# Patient Record
Sex: MALE | Race: WHITE | NOT HISPANIC OR LATINO | Employment: FULL TIME | URBAN - METROPOLITAN AREA
[De-identification: names, ages, dates, MRNs, and addresses within clinical notes are randomized per-mention and may not be internally consistent; named-entity substitution may affect disease eponyms.]

---

## 2017-12-30 ENCOUNTER — OFFICE VISIT (OUTPATIENT)
Dept: URGENT CARE | Facility: PHYSICIAN GROUP | Age: 27
End: 2017-12-30
Payer: COMMERCIAL

## 2017-12-30 VITALS
SYSTOLIC BLOOD PRESSURE: 112 MMHG | TEMPERATURE: 98.9 F | DIASTOLIC BLOOD PRESSURE: 78 MMHG | WEIGHT: 263 LBS | HEART RATE: 73 BPM | HEIGHT: 74 IN | BODY MASS INDEX: 33.75 KG/M2 | OXYGEN SATURATION: 95 % | RESPIRATION RATE: 16 BRPM

## 2017-12-30 DIAGNOSIS — H66.002 ACUTE SUPPURATIVE OTITIS MEDIA OF LEFT EAR WITHOUT SPONTANEOUS RUPTURE OF TYMPANIC MEMBRANE, RECURRENCE NOT SPECIFIED: Primary | ICD-10-CM

## 2017-12-30 DIAGNOSIS — H91.92 DECREASED HEARING OF LEFT EAR: ICD-10-CM

## 2017-12-30 PROCEDURE — 99204 OFFICE O/P NEW MOD 45 MIN: CPT | Performed by: PHYSICIAN ASSISTANT

## 2017-12-30 RX ORDER — LORATADINE 10 MG/1
10 TABLET ORAL DAILY
COMMUNITY
End: 2018-09-20

## 2017-12-30 RX ORDER — AMOXICILLIN AND CLAVULANATE POTASSIUM 875; 125 MG/1; MG/1
1 TABLET, FILM COATED ORAL 2 TIMES DAILY
Qty: 20 TAB | Refills: 0 | Status: SHIPPED | OUTPATIENT
Start: 2017-12-30 | End: 2018-09-20

## 2017-12-30 NOTE — PROGRESS NOTES
"Subjective:      Colin Hart is a 27 y.o. male who presents with Ear Fullness (C/o LT ear fullness, loss of hearing x2 wks off/on)    PMH: Reviewed with patient/family member/EPIC.   MEDS:   Current Outpatient Prescriptions:   •  Chlorpheniramine Maleate (ALLERGY PO), Take  by mouth., Disp: , Rfl:   •  loratadine (CLARITIN) 10 MG Tab, Take 10 mg by mouth every day., Disp: , Rfl:   •  oxycodone-acetaminophen (PERCOCET) 7.5-325 MG per tablet, Take 1 Tab by mouth every four hours as needed for Mild Pain., Disp: 30 Each, Rfl: 0  ALLERGIES: No Known Allergies  SURGHX: History reviewed. No pertinent surgical history.  SOCHX:  reports that he has never smoked. His smokeless tobacco use includes Chew.  FH:  Reviewed with patient/family. Not pertinent to this complaint.         Ear Fullness   This is a new problem. The current episode started 1 to 4 weeks ago. The problem occurs constantly. The problem has been gradually worsening. Associated symptoms include congestion and swollen glands. Pertinent negatives include no coughing, fever, headaches, sore throat or vertigo. Exacerbated by: lying down, talking. He has tried NSAIDs and position changes for the symptoms. The treatment provided no relief.       Review of Systems   Constitutional: Negative for fever.   HENT: Positive for congestion, ear pain and hearing loss. Negative for sore throat.    Respiratory: Negative for cough.    Neurological: Negative for vertigo and headaches.   All other systems reviewed and are negative.         Objective:     /78   Pulse 73   Temp 37.2 °C (98.9 °F)   Resp 16   Ht 1.88 m (6' 2\")   Wt 119.3 kg (263 lb)   SpO2 95%   BMI 33.77 kg/m²      Physical Exam   Constitutional: He is oriented to person, place, and time. He appears well-developed and well-nourished. No distress.   HENT:   Head: Normocephalic and atraumatic.   Right Ear: Tympanic membrane normal.   Left Ear: No drainage or tenderness. Tympanic membrane is erythematous " and bulging. A middle ear effusion is present. Decreased hearing is noted.   Nose: Nose normal.   Mouth/Throat: Uvula is midline, oropharynx is clear and moist and mucous membranes are normal.   Eyes: Conjunctivae and EOM are normal. Pupils are equal, round, and reactive to light.   Neck: Normal range of motion. Neck supple.   Cardiovascular: Normal rate, regular rhythm and normal heart sounds.    Pulmonary/Chest: Effort normal and breath sounds normal.   Musculoskeletal: Normal range of motion.   Neurological: He is alert and oriented to person, place, and time. Gait normal.   Skin: Skin is warm and dry. Capillary refill takes less than 2 seconds.   Psychiatric: He has a normal mood and affect.   Nursing note and vitals reviewed.              Assessment/Plan:     1. Acute suppurative otitis media of left ear without spontaneous rupture of tympanic membrane, recurrence not specified  amoxicillin-clavulanate (AUGMENTIN) 875-125 MG Tab   2. Decreased hearing of left ear  amoxicillin-clavulanate (AUGMENTIN) 875-125 MG Tab   PT can take otc decongestant like sudafed.    PT should follow up with PCP in 1-2 days for re-evaluation if symptoms have not improved.  Discussed red flags and reasons to return to UC or ED.  Pt and/or family verbalized understanding of diagnosis and follow up instructions and was offered informational handout on diagnosis.  PT discharged.

## 2018-01-02 ASSESSMENT — ENCOUNTER SYMPTOMS
VERTIGO: 0
FEVER: 0
SWOLLEN GLANDS: 1
COUGH: 0
HEADACHES: 0
SORE THROAT: 0

## 2018-04-17 ENCOUNTER — OFFICE VISIT (OUTPATIENT)
Dept: URGENT CARE | Facility: PHYSICIAN GROUP | Age: 28
End: 2018-04-17
Payer: COMMERCIAL

## 2018-04-17 VITALS
DIASTOLIC BLOOD PRESSURE: 74 MMHG | OXYGEN SATURATION: 97 % | BODY MASS INDEX: 34.27 KG/M2 | HEIGHT: 74 IN | TEMPERATURE: 98.1 F | HEART RATE: 88 BPM | WEIGHT: 267 LBS | SYSTOLIC BLOOD PRESSURE: 116 MMHG

## 2018-04-17 DIAGNOSIS — R11.0 NAUSEA: ICD-10-CM

## 2018-04-17 DIAGNOSIS — A09 INFECTIOUS ENTERITIS, UNSPECIFIED INFECTIOUS AGENT: ICD-10-CM

## 2018-04-17 PROCEDURE — 99203 OFFICE O/P NEW LOW 30 MIN: CPT | Performed by: EMERGENCY MEDICINE

## 2018-04-17 RX ORDER — ONDANSETRON 8 MG/1
8 TABLET, ORALLY DISINTEGRATING ORAL EVERY 8 HOURS PRN
Qty: 3 TAB | Refills: 0 | Status: SHIPPED | OUTPATIENT
Start: 2018-04-17 | End: 2018-09-20

## 2018-04-17 ASSESSMENT — ENCOUNTER SYMPTOMS
SHORTNESS OF BREATH: 0
COUGH: 0
SORE THROAT: 0
VOMITING: 0
ABDOMINAL PAIN: 1
NAUSEA: 1
CHILLS: 1
BLOOD IN STOOL: 0
FLANK PAIN: 0
DIARRHEA: 1
FEVER: 0

## 2018-04-17 NOTE — PATIENT INSTRUCTIONS
Use over-the-counter acetaminophen (Tylenol) as needed for pain relief; follow the package instructions for dosing.  Use over-the-counter famotidine (Pepcid AC), ranitidine (Zantac) or cimetidine (Tagamet) as needed for relief of symptoms; follow the package instructions for dosing.    Food Choices to Help Relieve Diarrhea, Adult  When you have diarrhea, the foods you eat and your eating habits are very important. Choosing the right foods and drinks can help relieve diarrhea. Also, because diarrhea can last up to 7 days, you need to replace lost fluids and electrolytes (such as sodium, potassium, and chloride) in order to help prevent dehydration.   WHAT GENERAL GUIDELINES DO I NEED TO FOLLOW?  · Slowly drink 1 cup (8 oz) of fluid for each episode of diarrhea. If you are getting enough fluid, your urine will be clear or pale yellow.  · Eat starchy foods. Some good choices include white rice, white toast, pasta, low-fiber cereal, baked potatoes (without the skin), saltine crackers, and bagels.  · Avoid large servings of any cooked vegetables.  · Limit fruit to two servings per day. A serving is ½ cup or 1 small piece.  · Choose foods with less than 2 g of fiber per serving.  · Limit fats to less than 8 tsp (38 g) per day.  · Avoid fried foods.  · Eat foods that have probiotics in them. Probiotics can be found in certain dairy products.  · Avoid foods and beverages that may increase the speed at which food moves through the stomach and intestines (gastrointestinal tract). Things to avoid include:  ¨ High-fiber foods, such as dried fruit, raw fruits and vegetables, nuts, seeds, and whole grain foods.  ¨ Spicy foods and high-fat foods.  ¨ Foods and beverages sweetened with high-fructose corn syrup, honey, or sugar alcohols such as xylitol, sorbitol, and mannitol.  WHAT FOODS ARE RECOMMENDED?  Grains  White rice. White, French, or priya breads (fresh or toasted), including plain rolls, buns, or bagels. White pasta.  Saltine, soda, or thao crackers. Pretzels. Low-fiber cereal. Cooked cereals made with water (such as cornmeal, farina, or cream cereals). Plain muffins. Matzo. Pam toast. Zwieback.   Vegetables  Potatoes (without the skin). Strained tomato and vegetable juices. Most well-cooked and canned vegetables without seeds. Tender lettuce.  Fruits  Cooked or canned applesauce, apricots, cherries, fruit cocktail, grapefruit, peaches, pears, or plums. Fresh bananas, apples without skin, cherries, grapes, cantaloupe, grapefruit, peaches, oranges, or plums.   Meat and Other Protein Products  Baked or boiled chicken. Eggs. Tofu. Fish. Seafood. Smooth peanut butter. Ground or well-cooked tender beef, ham, veal, lamb, pork, or poultry.   Dairy  Plain yogurt, kefir, and unsweetened liquid yogurt. Lactose-free milk, buttermilk, or soy milk. Plain hard cheese.  Beverages  Sport drinks. Clear broths. Diluted fruit juices (except prune). Regular, caffeine-free sodas such as ginger ale. Water. Decaffeinated teas. Oral rehydration solutions. Sugar-free beverages not sweetened with sugar alcohols.  Other  Bouillon, broth, or soups made from recommended foods.   The items listed above may not be a complete list of recommended foods or beverages. Contact your dietitian for more options.  WHAT FOODS ARE NOT RECOMMENDED?  Grains  Whole grain, whole wheat, bran, or rye breads, rolls, pastas, crackers, and cereals. Wild or brown rice. Cereals that contain more than 2 g of fiber per serving. Corn tortillas or taco shells. Cooked or dry oatmeal. Granola. Popcorn.  Vegetables  Raw vegetables. Cabbage, broccoli, Roland sprouts, artichokes, baked beans, beet greens, corn, kale, legumes, peas, sweet potatoes, and yams. Potato skins. Cooked spinach and cabbage.  Fruits  Dried fruit, including raisins and dates. Raw fruits. Stewed or dried prunes. Fresh apples with skin, apricots, mangoes, pears, raspberries, and strawberries.   Meat and Other  Protein Products  Surprise peanut butter. Nuts and seeds. Beans and lentils. Waterman.   Dairy  High-fat cheeses. Milk, chocolate milk, and beverages made with milk, such as milk shakes. Cream. Ice cream.  Sweets and Desserts  Sweet rolls, doughnuts, and sweet breads. Pancakes and waffles.  Fats and Oils  Butter. Cream sauces. Margarine. Salad oils. Plain salad dressings. Olives. Avocados.   Beverages  Caffeinated beverages (such as coffee, tea, soda, or energy drinks). Alcoholic beverages. Fruit juices with pulp. Prune juice. Soft drinks sweetened with high-fructose corn syrup or sugar alcohols.  Other  Coconut. Hot sauce. Chili powder. Mayonnaise. Gravy. Cream-based or milk-based soups.   The items listed above may not be a complete list of foods and beverages to avoid. Contact your dietitian for more information.  WHAT SHOULD I DO IF I BECOME DEHYDRATED?  Diarrhea can sometimes lead to dehydration. Signs of dehydration include dark urine and dry mouth and skin. If you think you are dehydrated, you should rehydrate with an oral rehydration solution. These solutions can be purchased at pharmacies, retail stores, or online.   Drink ½-1 cup (120-240 mL) of oral rehydration solution each time you have an episode of diarrhea. If drinking this amount makes your diarrhea worse, try drinking smaller amounts more often. For example, drink 1-3 tsp (5-15 mL) every 5-10 minutes.   A general rule for staying hydrated is to drink 1½-2 L of fluid per day. Talk to your health care provider about the specific amount you should be drinking each day. Drink enough fluids to keep your urine clear or pale yellow.     This information is not intended to replace advice given to you by your health care provider. Make sure you discuss any questions you have with your health care provider.     Document Released: 03/09/2005 Document Revised: 01/08/2016 Document Reviewed: 11/10/2014  Elsevier Interactive Patient Education ©2016 [employer]    Viral Gastroenteritis, Adult  Introduction  Viral gastroenteritis is also known as the stomach flu. This condition is caused by certain germs (viruses). These germs can be passed from person to person very easily (are very contagious). This condition can cause sudden watery poop (diarrhea), fever, and throwing up (vomiting).  Having watery poop and throwing up can make you feel weak and cause you to get dehydrated. Dehydration can make you tired and thirsty, make you have a dry mouth, and make it so you pee (urinate) less often. Older adults and people with other diseases or a weak defense system (immune system) are at higher risk for dehydration. It is important to replace the fluids that you lose from having watery poop and throwing up.  Follow these instructions at home:  Follow instructions from your doctor about how to care for yourself at home.  Eating and drinking  Follow these instructions as told by your doctor:  · Take an oral rehydration solution (ORS). This is a drink that is sold at pharmacies and stores.  · Drink clear fluids in small amounts as you are able, such as:  ¨ Water.  ¨ Ice chips.  ¨ Diluted fruit juice.  ¨ Low-calorie sports drinks.  · Eat bland, easy-to-digest foods in small amounts as you are able, such as:  ¨ Bananas.  ¨ Applesauce.  ¨ Rice.  ¨ Low-fat (lean) meats.  ¨ Toast.  ¨ Crackers.  · Avoid fluids that have a lot of sugar or caffeine in them.  · Avoid alcohol.  · Avoid spicy or fatty foods.  General instructions  · Drink enough fluid to keep your pee (urine) clear or pale yellow.  · Wash your hands often. If you cannot use soap and water, use hand .  · Make sure that all people in your home wash their hands well and often.  · Rest at home while you get better.  · Take over-the-counter and prescription medicines only as told by your doctor.  · Watch your condition for any changes.  · Take a warm bath to help with any burning or pain from having watery poop.  · Keep  all follow-up visits as told by your doctor. This is important.  Contact a doctor if:  · You cannot keep fluids down.  · Your symptoms get worse.  · You have new symptoms.  · You feel light-headed or dizzy.  · You have muscle cramps.  Get help right away if:  · You have chest pain.  · You feel very weak or you pass out (faint).  · You see blood in your throw-up.  · Your throw-up looks like coffee grounds.  · You have bloody or black poop (stools) or poop that look like tar.  · You have a very bad headache, a stiff neck, or both.  · You have a rash.  · You have very bad pain, cramping, or bloating in your belly (abdomen).  · You have trouble breathing.  · You are breathing very quickly.  · Your heart is beating very quickly.  · Your skin feels cold and clammy.  · You feel confused.  · You have pain when you pee.  · You have signs of dehydration, such as:  ¨ Dark pee, hardly any pee, or no pee.  ¨ Cracked lips.  ¨ Dry mouth.  ¨ Sunken eyes.  ¨ Sleepiness.  ¨ Weakness.  This information is not intended to replace advice given to you by your health care provider. Make sure you discuss any questions you have with your health care provider.  Document Released: 06/05/2009 Document Revised: 07/07/2017 Document Reviewed: 08/23/2016  © 2017 Elsevier

## 2018-04-17 NOTE — LETTER
April 17, 2018       Patient: Colin Hart   YOB: 1990   Date of Visit: 4/17/2018         To Whom It May Concern:    It is my medical opinion that Colin Hart should not attend work today or tomorrow.      Sincerely,          Brian Cardenas M.D.  Electronically Signed

## 2018-04-17 NOTE — PROGRESS NOTES
Subjective:      Colin Hart is a 27 y.o. male who presents with Abdominal Pain (Upper middle abdominal pain, diarrhea - onset last night)            Diarrhea    This is a new problem. The current episode started yesterday. The problem occurs more than 10 times per day. The problem has been gradually improving. The stool consistency is described as watery. Associated symptoms include abdominal pain and chills. Pertinent negatives include no coughing, fever or vomiting. Nothing aggravates the symptoms. Risk factors include travel to endemic area. He has tried increased fluids for the symptoms. The treatment provided no relief. There is no history of inflammatory bowel disease or irritable bowel syndrome.   Notes travel to [place] several months ago, no symptoms there. No recent antibiotic use, no recent high-risk exposure otherwise. Onset associated with crampy diffuse abdominal pain, nausea with water brash, then multiple episodes of diarrhea.    Review of Systems   Constitutional: Positive for chills and malaise/fatigue. Negative for fever.   HENT: Negative for sore throat.    Respiratory: Negative for cough and shortness of breath.    Cardiovascular: Negative for chest pain.   Gastrointestinal: Positive for abdominal pain, diarrhea and nausea. Negative for blood in stool and vomiting.   Genitourinary: Negative for flank pain and hematuria.   Skin: Negative for rash.     PMH:  has no past medical history on file.  MEDS:   Current Outpatient Prescriptions:   •  ondansetron (ZOFRAN ODT) 8 MG TABLET DISPERSIBLE, Take 1 Tab by mouth every 8 hours as needed for Nausea., Disp: 3 Tab, Rfl: 0  •  Chlorpheniramine Maleate (ALLERGY PO), Take  by mouth., Disp: , Rfl:   •  loratadine (CLARITIN) 10 MG Tab, Take 10 mg by mouth every day., Disp: , Rfl:   •  amoxicillin-clavulanate (AUGMENTIN) 875-125 MG Tab, Take 1 Tab by mouth 2 times a day., Disp: 20 Tab, Rfl: 0  •  oxycodone-acetaminophen (PERCOCET) 7.5-325 MG per tablet, Take  "1 Tab by mouth every four hours as needed for Mild Pain., Disp: 30 Each, Rfl: 0  ALLERGIES: No Known Allergies  SURGHX: History reviewed. No pertinent surgical history.  SOCHX:  reports that he has never smoked. His smokeless tobacco use includes Chew. He reports that he does not drink alcohol or use drugs.  FH: family history is not on file.       Objective:     /74   Pulse 88   Temp 36.7 °C (98.1 °F)   Ht 1.88 m (6' 2\")   Wt 121.1 kg (267 lb)   SpO2 97%   BMI 34.28 kg/m²      Physical Exam   Constitutional: He is oriented to person, place, and time. He appears well-developed and well-nourished. He is cooperative. He does not have a sickly appearance. He does not appear ill. No distress.   HENT:   Mouth/Throat: Oropharynx is clear and moist and mucous membranes are normal.   Eyes: Conjunctivae are normal. No scleral icterus.   Cardiovascular: Normal rate, regular rhythm and normal heart sounds.    Pulmonary/Chest: Effort normal and breath sounds normal.   Abdominal: Soft. Bowel sounds are normal. He exhibits no distension. There is no hepatosplenomegaly. There is tenderness in the epigastric area and periumbilical area. There is no rigidity, no rebound, no guarding, no CVA tenderness and negative Cornell's sign.   Neurological: He is alert and oriented to person, place, and time.   Skin: Skin is warm and dry.   Psychiatric: He has a normal mood and affect.               Assessment/Plan:     1. Infectious enteritis, unspecified infectious agent  Suspect viral  Recommended supportive care measures, including rest, increasing oral fluid intake and use of over-the-counter medications for relief of symptoms.    2. Nausea  - ondansetron (ZOFRAN ODT) 8 MG TABLET DISPERSIBLE; Take 1 Tab by mouth every 8 hours as needed for Nausea.  Dispense: 3 Tab; Refill: 0      "

## 2018-06-12 ENCOUNTER — OFFICE VISIT (OUTPATIENT)
Dept: URGENT CARE | Facility: PHYSICIAN GROUP | Age: 28
End: 2018-06-12
Payer: COMMERCIAL

## 2018-06-12 VITALS
BODY MASS INDEX: 33.6 KG/M2 | SYSTOLIC BLOOD PRESSURE: 128 MMHG | HEART RATE: 96 BPM | OXYGEN SATURATION: 96 % | WEIGHT: 261.8 LBS | DIASTOLIC BLOOD PRESSURE: 74 MMHG | TEMPERATURE: 98.9 F | HEIGHT: 74 IN

## 2018-06-12 DIAGNOSIS — R22.31 AXILLARY LUMP, RIGHT: ICD-10-CM

## 2018-06-12 PROCEDURE — 99213 OFFICE O/P EST LOW 20 MIN: CPT | Performed by: EMERGENCY MEDICINE

## 2018-06-12 RX ORDER — DOXYCYCLINE HYCLATE 100 MG
100 TABLET ORAL 2 TIMES DAILY
Qty: 14 TAB | Refills: 0 | Status: SHIPPED | OUTPATIENT
Start: 2018-06-12 | End: 2018-09-20

## 2018-06-12 ASSESSMENT — ENCOUNTER SYMPTOMS
FOCAL WEAKNESS: 0
SENSORY CHANGE: 0
FEVER: 0
CHILLS: 0

## 2018-06-13 NOTE — PROGRESS NOTES
"Subjective:      Colin Hart is a 27 y.o. male who presents with Lump (Lump located on R armpit, tender, constant pain, x3 days )            Rash   This is a new problem. Episode onset: 3 days. The problem has been gradually worsening since onset. The affected locations include the right axilla. The rash is characterized by swelling and pain. He was exposed to nothing. Pertinent negatives include no fever or joint pain. Treatments tried: warm compress. The treatment provided mild relief.   No known trauma; tetanus up-to-date. Notes onset of right axillary lump became more tender.    Review of Systems   Constitutional: Positive for malaise/fatigue. Negative for chills and fever.   Musculoskeletal: Negative for joint pain.   Skin: Positive for rash. Negative for itching.   Neurological: Negative for sensory change and focal weakness.       PMH:  has no past medical history on file.  MEDS:   Current Outpatient Prescriptions:   •  doxycycline (VIBRAMYCIN) 100 MG Tab, Take 1 Tab by mouth 2 times a day., Disp: 14 Tab, Rfl: 0  •  ondansetron (ZOFRAN ODT) 8 MG TABLET DISPERSIBLE, Take 1 Tab by mouth every 8 hours as needed for Nausea., Disp: 3 Tab, Rfl: 0  •  Chlorpheniramine Maleate (ALLERGY PO), Take  by mouth., Disp: , Rfl:   •  loratadine (CLARITIN) 10 MG Tab, Take 10 mg by mouth every day., Disp: , Rfl:   •  amoxicillin-clavulanate (AUGMENTIN) 875-125 MG Tab, Take 1 Tab by mouth 2 times a day., Disp: 20 Tab, Rfl: 0  •  oxycodone-acetaminophen (PERCOCET) 7.5-325 MG per tablet, Take 1 Tab by mouth every four hours as needed for Mild Pain., Disp: 30 Each, Rfl: 0  ALLERGIES: No Known Allergies  SURGHX: History reviewed. No pertinent surgical history.  SOCHX:  reports that he has never smoked. His smokeless tobacco use includes Chew. He reports that he does not drink alcohol or use drugs.  FH: family history is not on file.     Objective:     /74   Pulse 96   Temp 37.2 °C (98.9 °F)   Ht 1.88 m (6' 2\")   Wt 118.8 " kg (261 lb 12.8 oz)   SpO2 96%   BMI 33.61 kg/m²      Physical Exam   Constitutional: Vital signs are normal. He appears well-developed and well-nourished. He is cooperative. He does not have a sickly appearance. No distress.   Cardiovascular:   Distal capillary refill intact.   Musculoskeletal:        Right shoulder: He exhibits normal range of motion, no crepitus and no deformity.   Lymphadenopathy:        Right axillary: No pectoral and no lateral adenopathy present.   Neurological: He is alert.   Distal sensation to light touch and pressure intact.   Skin: Skin is warm and dry. Lesion noted.        Psychiatric: He has a normal mood and affect.          Suspect early superficial abscess.     Assessment/Plan:     1. Axillary lump, right  Warm compresses, OTC analgesia.  Advised recheck in 2-3 days unless resolving.  - doxycycline (VIBRAMYCIN) 100 MG Tab; Take 1 Tab by mouth 2 times a day.  Dispense: 14 Tab; Refill: 0

## 2018-06-13 NOTE — PATIENT INSTRUCTIONS
Apply warm compresses for 10-15 minutes 3-4 times daily.  Use over-the-counter pain reliever, such as acetaminophen (Tylenol), ibuprofen (Advil, Motrin) or naproxen (Aleve) as needed; follow package directions for dosing.  Use an oral probiotic daily, such as Culturelle, Align, or yogurt to reduce gastrointestinal symptoms.  Plan return for recheck in 2-3 days unless resolving.

## 2018-09-20 ENCOUNTER — OFFICE VISIT (OUTPATIENT)
Dept: URGENT CARE | Facility: PHYSICIAN GROUP | Age: 28
End: 2018-09-20
Payer: COMMERCIAL

## 2018-09-20 VITALS
DIASTOLIC BLOOD PRESSURE: 80 MMHG | RESPIRATION RATE: 15 BRPM | OXYGEN SATURATION: 97 % | HEIGHT: 75 IN | HEART RATE: 78 BPM | BODY MASS INDEX: 32.83 KG/M2 | TEMPERATURE: 99.2 F | WEIGHT: 264 LBS | SYSTOLIC BLOOD PRESSURE: 130 MMHG

## 2018-09-20 DIAGNOSIS — J30.1 SEASONAL ALLERGIC RHINITIS DUE TO POLLEN: ICD-10-CM

## 2018-09-20 DIAGNOSIS — R42 VERTIGO: ICD-10-CM

## 2018-09-20 DIAGNOSIS — H66.006 RECURRENT ACUTE SUPPURATIVE OTITIS MEDIA WITHOUT SPONTANEOUS RUPTURE OF TYMPANIC MEMBRANE OF BOTH SIDES: Primary | ICD-10-CM

## 2018-09-20 PROCEDURE — 99214 OFFICE O/P EST MOD 30 MIN: CPT | Performed by: PHYSICIAN ASSISTANT

## 2018-09-20 RX ORDER — AMOXICILLIN AND CLAVULANATE POTASSIUM 875; 125 MG/1; MG/1
1 TABLET, FILM COATED ORAL 2 TIMES DAILY
Qty: 20 TAB | Refills: 0 | Status: SHIPPED | OUTPATIENT
Start: 2018-09-20 | End: 2018-09-30

## 2018-09-20 RX ORDER — MECLIZINE HCL 12.5 MG/1
12.5 TABLET ORAL 3 TIMES DAILY PRN
Qty: 30 TAB | Refills: 0 | Status: SHIPPED | OUTPATIENT
Start: 2018-09-20 | End: 2018-09-25

## 2018-09-20 ASSESSMENT — ENCOUNTER SYMPTOMS
EYE WATERING: 0
SINUS PRESSURE: 0
NUMBNESS: 0
COUGH: 1
MIGRAINE HEADACHES: 0
VISUAL CHANGE: 1
ABDOMINAL PAIN: 0
CHILLS: 0
BLURRED VISION: 1
EYE PAIN: 0
FEVER: 0
CLUSTER HEADACHES: 0
WEAKNESS: 0
DIZZINESS: 1
HEADACHES: 1
FATIGUE: 1
SORE THROAT: 0
CHANGE IN BOWEL HABIT: 0
NAUSEA: 0
VERTIGO: 1
PHOTOPHOBIA: 1
SCALP TENDERNESS: 0
SWOLLEN GLANDS: 0
VOMITING: 0

## 2018-09-20 NOTE — PATIENT INSTRUCTIONS
1. Allegra-D every day for 7 days   2. Flonase       Modified Semont maneuver    The modified Semont maneuver is illustrated for left posterior canalithiasis. The seated patient begins by turning the head 45º to the right. The patient then quickly drops the trunk to the left side (1) with the head turned 45º to the right. This position is held for at least 30 seconds or until any provoked vertigo subsides. The patient then quickly sits up and lies down on the right side without stopping in the upright position (2), all the while keeping the head turned 45º to the right so that the head now faces partly down into the bed. This position is held for 30 seconds or until vertigo subsides. Then patient returns to the upright position (3). This maneuver is repeated three times a day until the patient is asymptomatic. For right posterior canalithiasis, the maneuver must be performed in the opposite direction, starting with the head turned toward the left.      Otitis Media, Adult  Otitis media is redness, soreness, and puffiness (swelling) in the space just behind your eardrum (middle ear). It may be caused by allergies or infection. It often happens along with a cold.  Follow these instructions at home:  · Take your medicine as told. Finish it even if you start to feel better.  · Only take over-the-counter or prescription medicines for pain, discomfort, or fever as told by your doctor.  · Follow up with your doctor as told.  Contact a doctor if:  · You have otitis media only in one ear, or bleeding from your nose, or both.  · You notice a lump on your neck.  · You are not getting better in 3-5 days.  · You feel worse instead of better.  Get help right away if:  · You have pain that is not helped with medicine.  · You have puffiness, redness, or pain around your ear.  · You get a stiff neck.  · You cannot move part of your face (paralysis).  · You notice that the bone behind your ear hurts when you touch it.  This  information is not intended to replace advice given to you by your health care provider. Make sure you discuss any questions you have with your health care provider.  Document Released: 06/05/2009 Document Revised: 05/25/2017 Document Reviewed: 07/15/2014  [employer] Interactive Patient Education © 2017 [employer]

## 2018-09-20 NOTE — PROGRESS NOTES
Subjective:   Colin Hart is a 27 y.o. male who presents for Dizziness (yesterday ) and Headache (x 1 week )    Dizziness   This is a new problem. The current episode started today. The problem occurs constantly. Associated symptoms include coughing, fatigue, headaches, vertigo and a visual change. Pertinent negatives include no abdominal pain, change in bowel habit, chills, congestion, fever, nausea, numbness, sore throat, swollen glands, urinary symptoms, vomiting or weakness. Exacerbated by: head movements, sitting up or rolling over in bed.' He has tried NSAIDs for the symptoms. The treatment provided no relief.   Headache    This is a new problem. Episode onset: 4 weeks. The problem occurs intermittently. The problem has been unchanged. The pain is located in the bilateral and temporal region. The pain quality is similar to prior headaches. The quality of the pain is described as band-like, throbbing and dull. Associated symptoms include blurred vision, coughing, dizziness, ear pain, phonophobia, photophobia and a visual change. Pertinent negatives include no abdominal pain, eye pain, eye watering, fever, nausea, numbness, scalp tenderness, sinus pressure, sore throat, swollen glands, vomiting or weakness. The symptoms are aggravated by bright light. He has tried Excedrin for the symptoms. The treatment provided no relief. His past medical history is significant for migraines in the family, obesity and sinus disease. There is no history of cancer, cluster headaches, hypertension, immunosuppression, migraine headaches, recent head traumas or TMJ.       Pt has hx of vertigo associated with ear infection. Pt notes sx are worsening. Pt has hx of headaches 2 x a week. Pt notes a cough x 1 month, non-productive. Pt has hx of seasonal allergies. Pt currently tx with OTC allergy meds without improvement. Denies CP, SOB, syncope or presyncope.     Review of Systems   Constitutional: Positive for fatigue. Negative for  "chills and fever.   HENT: Positive for ear pain. Negative for congestion, sinus pressure and sore throat.    Eyes: Positive for blurred vision and photophobia. Negative for pain.   Respiratory: Positive for cough.    Gastrointestinal: Negative for abdominal pain, change in bowel habit, nausea and vomiting.   Neurological: Positive for dizziness, vertigo and headaches. Negative for weakness and numbness.       PMH:  has no past medical history on file.  MEDS:   Current Outpatient Prescriptions:   •  amoxicillin-clavulanate (AUGMENTIN) 875-125 MG Tab, Take 1 Tab by mouth 2 times a day for 10 days., Disp: 20 Tab, Rfl: 0  •  meclizine (ANTIVERT) 12.5 MG Tab, Take 1 Tab by mouth 3 times a day as needed for up to 5 days., Disp: 30 Tab, Rfl: 0  ALLERGIES: No Known Allergies  SURGHX: History reviewed. No pertinent surgical history.  SOCHX:  reports that he has never smoked. His smokeless tobacco use includes Chew. He reports that he does not drink alcohol or use drugs.  History reviewed. No pertinent family history.     Objective:   /80 (BP Location: Right arm, Patient Position: Sitting) Comment (BP Cuff Size): large  Pulse 78   Temp 37.3 °C (99.2 °F)   Resp 15   Ht 1.905 m (6' 3\")   Wt 119.7 kg (264 lb)   SpO2 97%   BMI 33.00 kg/m²     Physical Exam   Constitutional: He is oriented to person, place, and time. He appears well-developed and well-nourished. No distress.   HENT:   Head: Normocephalic and atraumatic.   Right Ear: Tympanic membrane is erythematous and retracted. Tympanic membrane is not perforated. A middle ear effusion is present.   Left Ear: Tympanic membrane is erythematous and bulging. Tympanic membrane is not perforated. A middle ear effusion is present.   Nose: Mucosal edema and rhinorrhea present. No sinus tenderness.   Mouth/Throat: No oropharyngeal exudate, posterior oropharyngeal edema or posterior oropharyngeal erythema.   Eyes: Pupils are equal, round, and reactive to light. Conjunctivae " and EOM are normal.   Neck: Normal range of motion. Neck supple.   Cardiovascular: Normal rate and regular rhythm.    Pulmonary/Chest: Effort normal and breath sounds normal. No respiratory distress. He has no wheezes. He has no rales.   Lymphadenopathy:     He has cervical adenopathy.   Neurological: He is alert and oriented to person, place, and time. He displays normal reflexes. No cranial nerve deficit or sensory deficit. He exhibits normal muscle tone. Coordination normal.   Skin: Skin is warm and dry.   Psychiatric: He has a normal mood and affect. His behavior is normal.   Vitals reviewed.        Assessment/Plan:     1. Recurrent acute suppurative otitis media without spontaneous rupture of tympanic membrane of both sides  amoxicillin-clavulanate (AUGMENTIN) 875-125 MG Tab    REFERRAL TO FOLLOW-UP WITH PRIMARY CARE   2. Vertigo  meclizine (ANTIVERT) 12.5 MG Tab   3. Seasonal allergic rhinitis due to pollen  REFERRAL TO FOLLOW-UP WITH PRIMARY CARE     Patient directed to take full course of abx regardless of sx resolution. If sx worsen or persist patient directed to return to clinic for reevaluation. Supportive care reviewed including: otc decongestant, Flonase and pushing fluids. Otitis media and Modified Semont maneuver educational handout given to patient.    Follow-up with primary care provider within 7-10 days. Red flags and emergency room precautions discussed.  Patient appears understanding of information.

## 2018-09-20 NOTE — LETTER
September 20, 2018         Patient: Colin Hart   YOB: 1990   Date of Visit: 9/20/2018           To Whom it May Concern:    Colin Hart was seen in my clinic on 9/20/2018. He may return to work on 9/22/18.    If you have any questions or concerns, please don't hesitate to call.        Sincerely,           Deb Gallardo P.A.-C.  Electronically Signed

## 2019-12-29 ENCOUNTER — OFFICE VISIT (OUTPATIENT)
Dept: URGENT CARE | Facility: PHYSICIAN GROUP | Age: 29
End: 2019-12-29
Payer: COMMERCIAL

## 2019-12-29 VITALS
BODY MASS INDEX: 32.87 KG/M2 | DIASTOLIC BLOOD PRESSURE: 82 MMHG | HEART RATE: 86 BPM | TEMPERATURE: 98.2 F | WEIGHT: 263 LBS | OXYGEN SATURATION: 97 % | RESPIRATION RATE: 14 BRPM | SYSTOLIC BLOOD PRESSURE: 110 MMHG

## 2019-12-29 DIAGNOSIS — H69.93 DYSFUNCTION OF BOTH EUSTACHIAN TUBES: ICD-10-CM

## 2019-12-29 DIAGNOSIS — R51.9 NONINTRACTABLE HEADACHE, UNSPECIFIED CHRONICITY PATTERN, UNSPECIFIED HEADACHE TYPE: ICD-10-CM

## 2019-12-29 PROCEDURE — 99214 OFFICE O/P EST MOD 30 MIN: CPT | Performed by: PHYSICIAN ASSISTANT

## 2019-12-29 RX ORDER — METHYLPREDNISOLONE 4 MG/1
TABLET ORAL
Qty: 21 TAB | Refills: 0 | Status: SHIPPED | OUTPATIENT
Start: 2019-12-29 | End: 2021-03-18

## 2019-12-29 RX ORDER — AMOXICILLIN AND CLAVULANATE POTASSIUM 875; 125 MG/1; MG/1
1 TABLET, FILM COATED ORAL 2 TIMES DAILY
Qty: 20 TAB | Refills: 0 | Status: SHIPPED | OUTPATIENT
Start: 2019-12-29 | End: 2021-03-18

## 2019-12-29 NOTE — PROGRESS NOTES
Chief Complaint   Patient presents with   • Otalgia     R ear    • Head Pain       HISTORY OF PRESENT ILLNESS: Patient is a 29 y.o. male who presents today for the following:    HA x 5 days, across the forehead, into nose/behind eyes  Right ear pain; denies drainage; muffled hearing  Mild fever a few days ago  H/o OM right ear  OTC meds today: none    There are no active problems to display for this patient.      Allergies:Patient has no known allergies.    Current Outpatient Medications Ordered in Epic   Medication Sig Dispense Refill   • methylPREDNISolone (MEDROL DOSEPAK) 4 MG Tablet Therapy Pack Use as package directs 21 Tab 0   • amoxicillin-clavulanate (AUGMENTIN) 875-125 MG Tab Take 1 Tab by mouth 2 times a day. 20 Tab 0     No current Epic-ordered facility-administered medications on file.        History reviewed. No pertinent past medical history.    Social History     Tobacco Use   • Smoking status: Never Smoker   • Smokeless tobacco: Current User     Types: Chew   Substance Use Topics   • Alcohol use: No   • Drug use: No       No family status information on file.   History reviewed. No pertinent family history.    Review of Systems:   Constitutional ROS: No unexpected change in weight, No weakness, No fatigue  Eye ROS: No recent significant change in vision, No eye pain, redness, discharge  Ear ROS: No drainage, No tinnitus or vertigo, No recent change in hearing  Mouth/Throat ROS: No teeth or gum problems, No bleeding gums, No tongue complaints  Neck ROS: No swollen glands, No significant pain in neck  Pulmonary ROS: No chronic cough, sputum, or hemoptysis, No dyspnea on exertion, No wheezing  Cardiovascular ROS: No diaphoresis, No edema, No palpitations  Musculoskeletal/Extremities ROS: No peripheral edema, No pain, redness or swelling on the joints  Hematologic/Lymphatic ROS: No chills, No night sweats, No weight loss  Skin/Integumentary ROS: No edema, No evidence of rash, No itching      Exam:  BP  110/82   Pulse 86   Temp 36.8 °C (98.2 °F) (Temporal)   Resp 14   Wt 119.3 kg (263 lb)   SpO2 97%   General: Well developed, well nourished. No distress.    Eye: PERRL/EOMI; conjunctivae clear, lids normal.  ENMT: Lips without lesions, MMM. Oropharynx is clear. Bilateral TMs are within normal limits but with serous effusions bilaterally and bulging, right worse than left.  The right TM is concerning for rupture..  Pulmonary: Unlabored respiratory effort. Lungs clear to auscultation, no wheezes, no rhonchi.    Cardiovascular: Regular rate and rhythm without murmur.   Neurologic: Grossly nonfocal. No facial asymmetry noted.  Lymph: No cervical lymphadenopathy noted.  Skin: Warm, dry, good turgor. No rashes in visible areas.   Psych: Normal mood. Alert and oriented to person, place and time.    Assessment/Plan:  Discussed likely viral etiology versus seasonal allergies.  Question if the headache is due to sinusitis.  We will have patient start with steroids and fill antibiotics if symptoms worsen at any point.  Discussed appropriate over-the-counter symptomatic medication, and when to return to clinic. Follow up for worsening or persistent symptoms.  1. Dysfunction of both eustachian tubes  methylPREDNISolone (MEDROL DOSEPAK) 4 MG Tablet Therapy Pack    amoxicillin-clavulanate (AUGMENTIN) 875-125 MG Tab   2. Nonintractable headache, unspecified chronicity pattern, unspecified headache type  amoxicillin-clavulanate (AUGMENTIN) 875-125 MG Tab

## 2020-02-06 ENCOUNTER — NON-PROVIDER VISIT (OUTPATIENT)
Dept: URGENT CARE | Facility: PHYSICIAN GROUP | Age: 30
End: 2020-02-06

## 2020-02-06 DIAGNOSIS — Z02.1 PRE-EMPLOYMENT DRUG SCREENING: ICD-10-CM

## 2020-02-06 PROCEDURE — 80305 DRUG TEST PRSMV DIR OPT OBS: CPT | Performed by: NURSE PRACTITIONER

## 2020-12-30 ENCOUNTER — OFFICE VISIT (OUTPATIENT)
Dept: URGENT CARE | Facility: PHYSICIAN GROUP | Age: 30
End: 2020-12-30
Payer: COMMERCIAL

## 2020-12-30 VITALS
HEIGHT: 75 IN | SYSTOLIC BLOOD PRESSURE: 118 MMHG | BODY MASS INDEX: 32.33 KG/M2 | OXYGEN SATURATION: 95 % | DIASTOLIC BLOOD PRESSURE: 74 MMHG | WEIGHT: 260 LBS | TEMPERATURE: 98.6 F | HEART RATE: 86 BPM | RESPIRATION RATE: 16 BRPM

## 2020-12-30 DIAGNOSIS — H65.04 RECURRENT ACUTE SEROUS OTITIS MEDIA OF RIGHT EAR: ICD-10-CM

## 2020-12-30 DIAGNOSIS — H69.90 DYSFUNCTION OF EUSTACHIAN TUBE, UNSPECIFIED LATERALITY: ICD-10-CM

## 2020-12-30 PROCEDURE — 99214 OFFICE O/P EST MOD 30 MIN: CPT | Performed by: PHYSICIAN ASSISTANT

## 2020-12-30 RX ORDER — AMOXICILLIN AND CLAVULANATE POTASSIUM 875; 125 MG/1; MG/1
1 TABLET, FILM COATED ORAL 2 TIMES DAILY
Qty: 20 TAB | Refills: 0 | Status: SHIPPED | OUTPATIENT
Start: 2020-12-30 | End: 2021-01-09

## 2020-12-30 RX ORDER — METHYLPREDNISOLONE 4 MG/1
TABLET ORAL
Qty: 21 TAB | Refills: 0 | Status: SHIPPED | OUTPATIENT
Start: 2020-12-30 | End: 2021-03-18

## 2020-12-30 ASSESSMENT — ENCOUNTER SYMPTOMS
SENSORY CHANGE: 0
DIARRHEA: 0
RHINORRHEA: 0
CHILLS: 0
SORE THROAT: 0
PALPITATIONS: 0
VOMITING: 0
FOCAL WEAKNESS: 0
SHORTNESS OF BREATH: 0
FEVER: 0
SINUS PAIN: 0
COUGH: 0
TINGLING: 0
HEADACHES: 0
WHEEZING: 0
NECK PAIN: 0
DIZZINESS: 1

## 2020-12-30 NOTE — PROGRESS NOTES
"Subjective:      Colin Hart is a 30 y.o. male who presents with Otalgia ((R) ear pain-states this is a yearly occurance )            Otalgia   There is pain in the right ear. This is a new problem. The current episode started in the past 7 days. The problem occurs constantly. The problem has been gradually worsening. There has been no fever. The pain is moderate. Pertinent negatives include no coughing, diarrhea, ear discharge, headaches, hearing loss, neck pain, rash, rhinorrhea, sore throat or vomiting. Associated symptoms comments: Mild vertigo (happens ever year). He has tried nothing for the symptoms. recurrent ear infections       No past medical history on file.    No past surgical history on file.    No family history on file.    Patient has no known allergies.    Medications, Allergies, and current problem list reviewed today in Epic      Review of Systems   Constitutional: Negative for chills, fever and malaise/fatigue.   HENT: Positive for ear pain. Negative for congestion, ear discharge, hearing loss, rhinorrhea, sinus pain and sore throat.    Respiratory: Negative for cough, shortness of breath and wheezing.    Cardiovascular: Negative for chest pain, palpitations and leg swelling.   Gastrointestinal: Negative for diarrhea and vomiting.   Musculoskeletal: Negative for neck pain.   Skin: Negative for rash.   Neurological: Positive for dizziness (vertigo). Negative for tingling, sensory change, focal weakness and headaches.     All other systems reviewed and are negative.        Objective:     /74   Pulse 86   Temp 37 °C (98.6 °F)   Resp 16   Ht 1.905 m (6' 3\")   Wt 117.9 kg (260 lb)   SpO2 95%   BMI 32.50 kg/m²      Physical Exam  Constitutional:       General: He is not in acute distress.     Appearance: He is not ill-appearing.   HENT:      Head: Normocephalic and atraumatic.      Right Ear: Ear canal and external ear normal. A middle ear effusion is present. Tympanic membrane is " injected.      Left Ear: Tympanic membrane, ear canal and external ear normal.   Eyes:      Extraocular Movements: Extraocular movements intact.      Conjunctiva/sclera: Conjunctivae normal.      Pupils: Pupils are equal, round, and reactive to light.   Cardiovascular:      Rate and Rhythm: Normal rate.   Pulmonary:      Effort: Pulmonary effort is normal. No respiratory distress.      Breath sounds: No stridor. No wheezing.   Musculoskeletal: Normal range of motion.   Skin:     General: Skin is warm and dry.   Neurological:      General: No focal deficit present.      Mental Status: He is alert and oriented to person, place, and time.   Psychiatric:         Mood and Affect: Mood normal.         Behavior: Behavior normal.         Thought Content: Thought content normal.         Judgment: Judgment normal.                 Assessment/Plan:        1. Recurrent acute serous otitis media of right ear    2. Dysfunction of Eustachian tube, unspecified laterality      - amoxicillin-clavulanate (AUGMENTIN) 875-125 MG Tab; Take 1 Tab by mouth 2 times a day for 10 days.  Dispense: 20 Tab; Refill: 0    - methylPREDNISolone (MEDROL DOSEPAK) 4 MG Tablet Therapy Pack; Follow schedule on package instructions.  Dispense: 21 Tab; Refill: 0    Differential diagnoses, Supportive care, and indications for immediate follow-up discussed with patient.   Pathogenesis of diagnosis discussed including typical length and natural progression.   Instructed to return to clinic or nearest emergency department for any change in condition, further concerns, or worsening of symptoms.    The patient demonstrated a good understanding and agreed with the treatment plan.    Yudi Mejia P.A.-C.

## 2021-01-06 ENCOUNTER — OFFICE VISIT (OUTPATIENT)
Dept: URGENT CARE | Facility: PHYSICIAN GROUP | Age: 31
End: 2021-01-06
Payer: COMMERCIAL

## 2021-01-06 ENCOUNTER — HOSPITAL ENCOUNTER (OUTPATIENT)
Facility: MEDICAL CENTER | Age: 31
End: 2021-01-06
Attending: PHYSICIAN ASSISTANT
Payer: COMMERCIAL

## 2021-01-06 VITALS
SYSTOLIC BLOOD PRESSURE: 110 MMHG | HEART RATE: 96 BPM | WEIGHT: 259 LBS | HEIGHT: 75 IN | DIASTOLIC BLOOD PRESSURE: 78 MMHG | BODY MASS INDEX: 32.2 KG/M2 | TEMPERATURE: 97.6 F | OXYGEN SATURATION: 98 % | RESPIRATION RATE: 16 BRPM

## 2021-01-06 DIAGNOSIS — R68.89 FLU-LIKE SYMPTOMS: ICD-10-CM

## 2021-01-06 LAB — COVID ORDER STATUS COVID19: NORMAL

## 2021-01-06 PROCEDURE — U0005 INFEC AGEN DETEC AMPLI PROBE: HCPCS

## 2021-01-06 PROCEDURE — U0003 INFECTIOUS AGENT DETECTION BY NUCLEIC ACID (DNA OR RNA); SEVERE ACUTE RESPIRATORY SYNDROME CORONAVIRUS 2 (SARS-COV-2) (CORONAVIRUS DISEASE [COVID-19]), AMPLIFIED PROBE TECHNIQUE, MAKING USE OF HIGH THROUGHPUT TECHNOLOGIES AS DESCRIBED BY CMS-2020-01-R: HCPCS

## 2021-01-06 PROCEDURE — 99213 OFFICE O/P EST LOW 20 MIN: CPT | Performed by: PHYSICIAN ASSISTANT

## 2021-01-06 NOTE — PROGRESS NOTES
Chief Complaint   Patient presents with   • Coronavirus Screening     loss of taste, fatigue        HISTORY OF PRESENT ILLNESS: Patient is a 30 y.o. male who presents today for the following:    Patient comes in for evaluation of body aches that started 2 days ago.  He complains of loss of taste and smell and very fatigued.  He reports associated chills but denies fever.  He denies shortness of breath and cough.  He denies any known Covid exposures and is not currently working.    There are no active problems to display for this patient.      Allergies:Patient has no known allergies.    Current Outpatient Medications Ordered in Epic   Medication Sig Dispense Refill   • amoxicillin-clavulanate (AUGMENTIN) 875-125 MG Tab Take 1 Tab by mouth 2 times a day for 10 days. 20 Tab 0   • methylPREDNISolone (MEDROL DOSEPAK) 4 MG Tablet Therapy Pack Follow schedule on package instructions. 21 Tab 0   • methylPREDNISolone (MEDROL DOSEPAK) 4 MG Tablet Therapy Pack Use as package directs (Patient not taking: Reported on 12/30/2020) 21 Tab 0   • amoxicillin-clavulanate (AUGMENTIN) 875-125 MG Tab Take 1 Tab by mouth 2 times a day. (Patient not taking: Reported on 12/30/2020) 20 Tab 0     No current Epic-ordered facility-administered medications on file.        No past medical history on file.    Social History     Tobacco Use   • Smoking status: Never Smoker   • Smokeless tobacco: Current User     Types: Chew   Substance Use Topics   • Alcohol use: No   • Drug use: No       No family status information on file.   No family history on file.    Review of Systems:   Constitutional ROS: No unexpected change in weight, No weakness, No fatigue  Eye ROS: No recent significant change in vision, No eye pain, redness, discharge  Ear ROS: No drainage, No tinnitus or vertigo, No recent change in hearing  Mouth/Throat ROS: No teeth or gum problems, No bleeding gums, No tongue complaints  Neck ROS: No swollen glands, No significant pain in  "neck  Pulmonary ROS: No chronic cough, sputum, or hemoptysis, No dyspnea on exertion, No wheezing  Cardiovascular ROS: No diaphoresis, No edema, No palpitations  Musculoskeletal/Extremities ROS: No peripheral edema, No pain, redness or swelling on the joints  Hematologic/Lymphatic ROS: Positive for body aches and chills  Skin/Integumentary ROS: No edema, No evidence of rash, No itching    Exam:  /78   Pulse 96   Temp 36.4 °C (97.6 °F)   Resp 16   Ht 1.905 m (6' 3\")   Wt 117.5 kg (259 lb)   SpO2 98%   General: Well developed, well nourished. No distress.    Eye: PERRL/EOMI; conjunctivae clear, lids normal.  ENMT: Lips without lesions, MMM. Oropharynx is clear. Bilateral TMs are within normal limits.  Pulmonary: Unlabored respiratory effort. Lungs clear to auscultation, no wheezes, no rhonchi.    Cardiovascular: Regular rate and rhythm without murmur.   Neurologic: Grossly nonfocal. No facial asymmetry noted.  Lymph: No cervical lymphadenopathy noted.  Skin: Warm, dry, good turgor. No rashes in visible areas.   Psych: Normal mood. Alert and oriented to person, place and time.    Assessment/Plan:  Discussed likely viral etiology.  Vitals and exam are unremarkable.  Low suspicion for pneumonia. Patient will be tested for COVID and has been advised to quarantine until results are available. Discussed appropriate over-the-counter symptomatic medication, and when to return to clinic. Follow up for worsening or persistent symptoms.  1. Flu-like symptoms  COVID/SARS CoV-2 PCR       "

## 2021-01-07 LAB
SARS-COV-2 RNA RESP QL NAA+PROBE: NOTDETECTED
SPECIMEN SOURCE: NORMAL

## 2021-03-18 ENCOUNTER — OFFICE VISIT (OUTPATIENT)
Dept: URGENT CARE | Facility: PHYSICIAN GROUP | Age: 31
End: 2021-03-18
Payer: COMMERCIAL

## 2021-03-18 VITALS
HEIGHT: 75 IN | SYSTOLIC BLOOD PRESSURE: 118 MMHG | WEIGHT: 263 LBS | TEMPERATURE: 98.6 F | DIASTOLIC BLOOD PRESSURE: 72 MMHG | HEART RATE: 94 BPM | OXYGEN SATURATION: 95 % | BODY MASS INDEX: 32.7 KG/M2 | RESPIRATION RATE: 16 BRPM

## 2021-03-18 DIAGNOSIS — H69.93 DYSFUNCTION OF BOTH EUSTACHIAN TUBES: ICD-10-CM

## 2021-03-18 PROCEDURE — 99213 OFFICE O/P EST LOW 20 MIN: CPT | Performed by: FAMILY MEDICINE

## 2021-03-18 RX ORDER — METHYLPREDNISOLONE 4 MG/1
TABLET ORAL
Qty: 21 TABLET | Refills: 0 | Status: SHIPPED | OUTPATIENT
Start: 2021-03-18 | End: 2021-03-24

## 2021-03-18 RX ORDER — AMOXICILLIN AND CLAVULANATE POTASSIUM 875; 125 MG/1; MG/1
TABLET, FILM COATED ORAL
Qty: 20 TABLET | Refills: 0 | Status: SHIPPED | OUTPATIENT
Start: 2021-03-18 | End: 2021-11-16

## 2021-03-19 NOTE — PROGRESS NOTES
Chief Complaint:    Chief Complaint   Patient presents with   • Otalgia     both ears. pt states happens 2 times a year. began yesterday morning.        History of Present Illness:    Both ears have been stuffy and popping for few days. He then started having some dizziness. That is when he knows to come in to get medication. He gets similar problem on regular basis, usually Medrol Dose Tyler alone will resolve problem. If problem goes on too long, he will need antibiotic (usually Augmentin). He had PE tubes as child.      Past Medical History:    History reviewed. No pertinent past medical history.    Past Surgical History:    History reviewed. No pertinent surgical history.    Social History:    Social History     Socioeconomic History   • Marital status: Single     Spouse name: Not on file   • Number of children: Not on file   • Years of education: Not on file   • Highest education level: Not on file   Occupational History   • Not on file   Tobacco Use   • Smoking status: Never Smoker   • Smokeless tobacco: Current User     Types: Chew   Substance and Sexual Activity   • Alcohol use: No   • Drug use: No   • Sexual activity: Yes     Partners: Female   Other Topics Concern   • Not on file   Social History Narrative   • Not on file     Social Determinants of Health     Financial Resource Strain:    • Difficulty of Paying Living Expenses:    Food Insecurity:    • Worried About Running Out of Food in the Last Year:    • Ran Out of Food in the Last Year:    Transportation Needs:    • Lack of Transportation (Medical):    • Lack of Transportation (Non-Medical):    Physical Activity:    • Days of Exercise per Week:    • Minutes of Exercise per Session:    Stress:    • Feeling of Stress :    Social Connections:    • Frequency of Communication with Friends and Family:    • Frequency of Social Gatherings with Friends and Family:    • Attends [employer] Services:    • Active Member of Clubs or Organizations:    • Attends Club or  "Organization Meetings:    • Marital Status:    Intimate Partner Violence:    • Fear of Current or Ex-Partner:    • Emotionally Abused:    • Physically Abused:    • Sexually Abused:      Family History:    History reviewed. No pertinent family history.    Medications:    No current outpatient medications on file prior to visit.     No current facility-administered medications on file prior to visit.     Allergies:    No Known Allergies      Vitals:    Vitals:    03/18/21 1657   BP: 118/72   Pulse: 94   Resp: 16   Temp: 37 °C (98.6 °F)   TempSrc: Temporal   SpO2: 95%   Weight: 119 kg (263 lb)   Height: 1.905 m (6' 3\")       Physical Exam:    Constitutional: Vital signs reviewed. Appears well-developed and well-nourished. No acute distress.   Eyes: Sclera white, conjunctivae clear. PERRLA.  ENT: External ears normal. External auditory canals normal without discharge. TMs translucent and non-bulging. Hearing normal.   Pulmonary/Chest: Respirations non-labored.   Musculoskeletal: Normal gait.   Neurological: Alert and oriented to person, place, and time. CN 2-12 intact. Coordination normal.   Skin: No rashes or lesions. Warm, dry, normal turgor.  Psychiatric: Normal mood and affect. Behavior is normal. Judgment and thought content normal.       Assessment / Plan:    1. Dysfunction of both eustachian tubes  - methylPREDNISolone (MEDROL DOSEPAK) 4 MG Tablet Therapy Pack; TAKE AS DIRECTED ON PACKAGE.  Dispense: 21 tablet; Refill: 0  - amoxicillin-clavulanate (AUGMENTIN) 875-125 MG Tab; 1 TAB BY MOUTH TWICE A DAY X 10 DAYS. TAKE WITH FOOD.  Dispense: 20 tablet; Refill: 0      Discussed with him DDX, management options, and risks, benefits, and alternatives to treatment plan agreed upon.    Will start with Medrol Dose Tyler.    Back-up Rx for Augmentin he will fill and take if getting worse or not better with steroid alone.    Discussed expected course of duration, time for improvement, and to seek follow-up in Emergency Room, " urgent care, or with PCP if getting worse at any time or not improving within expected time frame.

## 2021-10-28 ENCOUNTER — OFFICE VISIT (OUTPATIENT)
Dept: URGENT CARE | Facility: PHYSICIAN GROUP | Age: 31
End: 2021-10-28
Payer: COMMERCIAL

## 2021-10-28 VITALS
SYSTOLIC BLOOD PRESSURE: 104 MMHG | DIASTOLIC BLOOD PRESSURE: 80 MMHG | TEMPERATURE: 98.2 F | HEIGHT: 75 IN | HEART RATE: 84 BPM | WEIGHT: 265 LBS | BODY MASS INDEX: 32.95 KG/M2 | RESPIRATION RATE: 12 BRPM | OXYGEN SATURATION: 98 %

## 2021-10-28 DIAGNOSIS — H65.192 OTHER ACUTE NONSUPPURATIVE OTITIS MEDIA OF LEFT EAR, RECURRENCE NOT SPECIFIED: ICD-10-CM

## 2021-10-28 PROCEDURE — 99213 OFFICE O/P EST LOW 20 MIN: CPT | Performed by: NURSE PRACTITIONER

## 2021-10-28 RX ORDER — AMOXICILLIN AND CLAVULANATE POTASSIUM 875; 125 MG/1; MG/1
1 TABLET, FILM COATED ORAL 2 TIMES DAILY
Qty: 14 TABLET | Refills: 0 | Status: SHIPPED | OUTPATIENT
Start: 2021-10-28 | End: 2021-11-04

## 2021-10-28 ASSESSMENT — ENCOUNTER SYMPTOMS
FEVER: 0
EYE REDNESS: 0
HEADACHES: 0
SORE THROAT: 0
WHEEZING: 0
WEAKNESS: 0
NECK PAIN: 0
VOMITING: 0
CHILLS: 0
NAUSEA: 0
MYALGIAS: 0
DIARRHEA: 0
EYE DISCHARGE: 0
COUGH: 0
ABDOMINAL PAIN: 0
SHORTNESS OF BREATH: 0
CONSTIPATION: 0
DIZZINESS: 0

## 2021-10-28 NOTE — PROGRESS NOTES
"Ilda Hart is a 30 y.o. male who presents with Otalgia (Left ear pain onset 4 days)            HPI  Acute left ear pain x 4 days. Denies fever, sore throat or nasal congestion. Prone to ear infection. States has been outdoors recently and change of weather may be a factor.     PMH:  has no past medical history on file.  MEDS:   Current Outpatient Medications:   •  amoxicillin-clavulanate (AUGMENTIN) 875-125 MG Tab, Take 1 Tablet by mouth 2 times a day for 7 days., Disp: 14 Tablet, Rfl: 0  •  amoxicillin-clavulanate (AUGMENTIN) 875-125 MG Tab, 1 TAB BY MOUTH TWICE A DAY X 10 DAYS. TAKE WITH FOOD., Disp: 20 tablet, Rfl: 0  ALLERGIES: No Known Allergies  SURGHX: History reviewed. No pertinent surgical history.  SOCHX:  reports that he has never smoked. His smokeless tobacco use includes chew. He reports that he does not drink alcohol and does not use drugs.  FH: Family history was reviewed, no pertinent findings to report    Review of Systems   Constitutional: Negative for chills, fever and malaise/fatigue.   HENT: Positive for ear pain. Negative for congestion, ear discharge, hearing loss, sore throat and tinnitus.    Eyes: Negative for discharge and redness.   Respiratory: Negative for cough, shortness of breath and wheezing.    Gastrointestinal: Negative for abdominal pain, constipation, diarrhea, nausea and vomiting.   Musculoskeletal: Negative for myalgias and neck pain.   Skin: Negative for itching and rash.   Neurological: Negative for dizziness, weakness and headaches.   Endo/Heme/Allergies: Negative for environmental allergies.   All other systems reviewed and are negative.             Objective     /80 (BP Location: Right arm, Patient Position: Sitting, BP Cuff Size: Adult)   Pulse 84   Temp 36.8 °C (98.2 °F) (Temporal)   Resp 12   Ht 1.905 m (6' 3\")   Wt 120 kg (265 lb)   SpO2 98%   BMI 33.12 kg/m²      Physical Exam  Vitals reviewed.   Constitutional:       General: He is awake. " He is not in acute distress.     Appearance: Normal appearance. He is well-developed. He is not ill-appearing, toxic-appearing or diaphoretic.   HENT:      Head: Normocephalic.      Right Ear: Hearing normal. A middle ear effusion is present.      Left Ear: Hearing normal. A middle ear effusion is present.      Ears:      Comments: Left ear canal redness.      Nose: No mucosal edema or rhinorrhea.      Mouth/Throat:      Mouth: Mucous membranes are dry.      Pharynx: Uvula midline. Posterior oropharyngeal erythema present. No uvula swelling.   Eyes:      Conjunctiva/sclera: Conjunctivae normal.      Pupils: Pupils are equal, round, and reactive to light.   Cardiovascular:      Rate and Rhythm: Normal rate and regular rhythm.   Pulmonary:      Effort: Pulmonary effort is normal. No accessory muscle usage or respiratory distress.      Breath sounds: Normal breath sounds.   Musculoskeletal:         General: Normal range of motion.      Cervical back: Normal range of motion and neck supple.   Lymphadenopathy:      Cervical: No cervical adenopathy.   Skin:     General: Skin is warm and dry.   Neurological:      Mental Status: He is alert and oriented to person, place, and time.   Psychiatric:         Behavior: Behavior is cooperative.                             Assessment & Plan        1. Other acute nonsuppurative otitis media of left ear, recurrence not specified    - amoxicillin-clavulanate (AUGMENTIN) 875-125 MG Tab; Take 1 Tablet by mouth 2 times a day for 7 days.  Dispense: 14 Tablet; Refill: 0            -May use over the counter NSAID for any fever or ear pain  -May use over the counter longer acting allergy medication for any sinus symptoms related to allergy related ear problems (chose one: Claritin/Zyrtec/Allegra without decongestant) x 1 week then as needed   -May use saline nasal spray for nasal congestion as needed up to 4x/day   -May use over the counter nasal decongestant like Flonase/Nasacort as needed  for allergy related ear problems x 1 week then as needed   -Maintain hydration status   -May use humidifier for any dry cough   -Monitor for fevers, difficulty or abnormal hearing, pain in ears, headache, dizziness- need re-evaluation

## 2021-11-16 ENCOUNTER — OFFICE VISIT (OUTPATIENT)
Dept: URGENT CARE | Facility: PHYSICIAN GROUP | Age: 31
End: 2021-11-16
Payer: COMMERCIAL

## 2021-11-16 VITALS
HEIGHT: 75 IN | OXYGEN SATURATION: 98 % | HEART RATE: 84 BPM | WEIGHT: 263 LBS | TEMPERATURE: 98.3 F | BODY MASS INDEX: 32.7 KG/M2 | DIASTOLIC BLOOD PRESSURE: 86 MMHG | SYSTOLIC BLOOD PRESSURE: 124 MMHG | RESPIRATION RATE: 14 BRPM

## 2021-11-16 DIAGNOSIS — H69.93 DYSFUNCTION OF BOTH EUSTACHIAN TUBES: ICD-10-CM

## 2021-11-16 PROCEDURE — 99214 OFFICE O/P EST MOD 30 MIN: CPT | Performed by: FAMILY MEDICINE

## 2021-11-16 RX ORDER — AMOXICILLIN AND CLAVULANATE POTASSIUM 875; 125 MG/1; MG/1
TABLET, FILM COATED ORAL
Qty: 20 TABLET | Refills: 0 | Status: SHIPPED | OUTPATIENT
Start: 2021-11-16 | End: 2022-05-23

## 2021-11-16 RX ORDER — METHYLPREDNISOLONE 4 MG/1
TABLET ORAL
Qty: 21 TABLET | Refills: 2 | Status: SHIPPED | OUTPATIENT
Start: 2021-11-16 | End: 2022-05-23

## 2021-11-17 NOTE — PROGRESS NOTES
Chief Complaint:    Chief Complaint   Patient presents with   • Ear Pain     bilateral, x1 week       History of Present Illness:    Discomfort in both ears x 1 week, L>R. He gets similar problem on regular basis, usually Medrol Dose Tyler alone will resolve problem. If problem goes on too long, he will need antibiotic (usually Augmentin). He had PE tubes as child. Was seen by other provider on 10/28/21, visit reviewed, prescribed Augmentin 875 mg BID x 7 days. He was not rx'd Medrol Dose Tyler, but feels this would help well.      Past Medical History:    History reviewed. No pertinent past medical history.    Past Surgical History:    History reviewed. No pertinent surgical history.    Social History:    Social History     Socioeconomic History   • Marital status: Single     Spouse name: Not on file   • Number of children: Not on file   • Years of education: Not on file   • Highest education level: Not on file   Occupational History   • Not on file   Tobacco Use   • Smoking status: Never Smoker   • Smokeless tobacco: Current User     Types: Chew   Substance and Sexual Activity   • Alcohol use: No   • Drug use: No   • Sexual activity: Yes     Partners: Female   Other Topics Concern   • Not on file   Social History Narrative   • Not on file     Social Determinants of Health     Financial Resource Strain:    • Difficulty of Paying Living Expenses: Not on file   Food Insecurity:    • Worried About Running Out of Food in the Last Year: Not on file   • Ran Out of Food in the Last Year: Not on file   Transportation Needs:    • Lack of Transportation (Medical): Not on file   • Lack of Transportation (Non-Medical): Not on file   Physical Activity:    • Days of Exercise per Week: Not on file   • Minutes of Exercise per Session: Not on file   Stress:    • Feeling of Stress : Not on file   Social Connections:    • Frequency of Communication with Friends and Family: Not on file   • Frequency of Social Gatherings with Friends and  "Family: Not on file   • Attends Restoration Services: Not on file   • Active Member of Clubs or Organizations: Not on file   • Attends Club or Organization Meetings: Not on file   • Marital Status: Not on file   Intimate Partner Violence:    • Fear of Current or Ex-Partner: Not on file   • Emotionally Abused: Not on file   • Physically Abused: Not on file   • Sexually Abused: Not on file   Housing Stability:    • Unable to Pay for Housing in the Last Year: Not on file   • Number of Places Lived in the Last Year: Not on file   • Unstable Housing in the Last Year: Not on file     Family History:    History reviewed. No pertinent family history.    Medications:    No current outpatient medications on file prior to visit.     No current facility-administered medications on file prior to visit.     Allergies:    No Known Allergies      Vitals:    Vitals:    21 1752   BP: 124/86   Pulse: 84   Resp: 14   Temp: 36.8 °C (98.3 °F)   SpO2: 98%   Weight: 119 kg (263 lb)   Height: 1.905 m (6' 3\")       Physical Exam:    Constitutional: Vital signs reviewed. Appears well-developed and well-nourished. No acute distress.   Eyes: Sclera white, conjunctivae clear.  ENT: Right TM has mild irritated appearance. Left TM translucent and non-bulging. External ears normal. External auditory canals normal without discharge. Hearing normal.   Neck: Neck supple.   Pulmonary/Chest: Respirations non-labored.   Musculoskeletal: Normal gait. No muscular atrophy or weakness.  Neurological: Alert and oriented to person, place, and time. Muscle tone normal. Coordination normal.   Skin: No rashes or lesions. Warm, dry, normal turgor.  Psychiatric: Normal mood and affect. Behavior is normal. Judgment and thought content normal.       Medical Decision Makin. Dysfunction of both eustachian tubes  - methylPREDNISolone (MEDROL DOSEPAK) 4 MG Tablet Therapy Pack; TAKE AS DIRECTED ON PACKAGE.  Dispense: 21 Tablet; Refill: 2  - " amoxicillin-clavulanate (AUGMENTIN) 875-125 MG Tab; 1 TAB BY MOUTH TWICE A DAY X 10 DAYS. TAKE WITH FOOD.  Dispense: 20 Tablet; Refill: 0      Discussed with him DDX, management options, and risks, benefits, and alternatives to treatment plan agreed upon.    Discomfort in both ears x 1 week, L>R. He gets similar problem on regular basis, usually Medrol Dose Tyler alone will resolve problem. If problem goes on too long, he will need antibiotic (usually Augmentin). He had PE tubes as child. Was seen by other provider on 10/28/21, visit reviewed, prescribed Augmentin 875 mg BID x 7 days. He was not rx'd Medrol Dose Tyler, but feels this would help well.    Right TM has mild irritated appearance. Left TM translucent and non-bulging.    Ears are chronic problem with acute exacerbation.    Medrol Dose Tyler usually helps well and he was agreeable to treat with this.    Back-up Rx for Augmentin he will fill and take if getting worse or not better with steroid alone.    Since he knows his body and ears well, I gave him a few refills on the Medrol Dose Tyler for future episodes if needed.    Discussed expected course of duration, time for improvement, and to seek follow-up in Emergency Room, urgent care, or with PCP if getting worse at any time or not improving within expected time frame.

## 2022-05-23 ENCOUNTER — OFFICE VISIT (OUTPATIENT)
Dept: INTERNAL MEDICINE | Facility: OTHER | Age: 32
End: 2022-05-23
Payer: COMMERCIAL

## 2022-05-23 VITALS
TEMPERATURE: 99.5 F | WEIGHT: 267.2 LBS | HEIGHT: 75 IN | DIASTOLIC BLOOD PRESSURE: 80 MMHG | OXYGEN SATURATION: 98 % | SYSTOLIC BLOOD PRESSURE: 120 MMHG | BODY MASS INDEX: 33.22 KG/M2 | HEART RATE: 88 BPM

## 2022-05-23 DIAGNOSIS — E66.9 OBESITY (BMI 30-39.9): ICD-10-CM

## 2022-05-23 DIAGNOSIS — G89.29 CHRONIC RUQ PAIN: ICD-10-CM

## 2022-05-23 DIAGNOSIS — R10.11 CHRONIC RUQ PAIN: ICD-10-CM

## 2022-05-23 PROCEDURE — 99204 OFFICE O/P NEW MOD 45 MIN: CPT | Mod: GC | Performed by: STUDENT IN AN ORGANIZED HEALTH CARE EDUCATION/TRAINING PROGRAM

## 2022-05-23 ASSESSMENT — ENCOUNTER SYMPTOMS
COUGH: 0
SINUS PAIN: 0
CHILLS: 0
FOCAL WEAKNESS: 0
DIZZINESS: 0
DOUBLE VISION: 0
DEPRESSION: 0
HEARTBURN: 0
SPEECH CHANGE: 0
SORE THROAT: 0
PALPITATIONS: 0
BLURRED VISION: 0
PND: 0
WEIGHT LOSS: 0
SHORTNESS OF BREATH: 0
HEMOPTYSIS: 0
NERVOUS/ANXIOUS: 0
MYALGIAS: 0
HALLUCINATIONS: 0
ABDOMINAL PAIN: 1
ORTHOPNEA: 0
SENSORY CHANGE: 0
NECK PAIN: 0
FEVER: 0
HEADACHES: 0
BRUISES/BLEEDS EASILY: 0
VOMITING: 0
PHOTOPHOBIA: 0
NAUSEA: 0
BLOOD IN STOOL: 0
SPUTUM PRODUCTION: 0
BACK PAIN: 0

## 2022-05-23 ASSESSMENT — LIFESTYLE VARIABLES: SUBSTANCE_ABUSE: 0

## 2022-05-23 NOTE — PROGRESS NOTES
New Patient    Colin Hart is a 31 y.o. male who presents today with the following:    CC: Establish Care with Primary Care Physician     HPI:    Colin is a 32 y/o young man coming to the clinic to establish care. He also has a chief complaint of RUQ pain that has been going on for years intermittently.  Pain is usually at night when he is asleep, occurs usually few hrs after he goes to bed, pain is in the RUQ and radiates to his right shoulder, pain also is referred in the epigastric region.  This pain has been going on for about 4-5 years and recurs every few months or so, most recent pain attack was about 3 weeks ago.  Today he is asymptomatic.  BP was noticed elevated, but rechecked was: 120/80    ROS:       Review of Systems   Constitutional: Negative for chills, fever, malaise/fatigue and weight loss.   HENT: Negative for congestion, ear discharge, nosebleeds, sinus pain, sore throat and tinnitus.    Eyes: Negative for blurred vision, double vision and photophobia.   Respiratory: Negative for cough, hemoptysis, sputum production and shortness of breath.    Cardiovascular: Negative for chest pain, palpitations, orthopnea, leg swelling and PND.   Gastrointestinal: Positive for abdominal pain. Negative for blood in stool, heartburn, melena, nausea and vomiting.   Genitourinary: Negative for dysuria, frequency, hematuria and urgency.   Musculoskeletal: Negative for back pain, joint pain, myalgias and neck pain.   Skin: Negative for rash.   Neurological: Negative for dizziness, sensory change, speech change, focal weakness and headaches.   Endo/Heme/Allergies: Does not bruise/bleed easily.   Psychiatric/Behavioral: Negative for depression, hallucinations, substance abuse and suicidal ideas. The patient is not nervous/anxious.          No past medical history on file.    No past surgical history on file.    No family history on file.    Social History     Tobacco Use   • Smoking status: Never Smoker   •  "Smokeless tobacco: Current User     Types: Chew   Substance Use Topics   • Alcohol use: No   • Drug use: No       No current outpatient medications on file.     No current facility-administered medications for this visit.       Physical Exam:  BP (!) 149/73 (BP Location: Right arm, Patient Position: Sitting, BP Cuff Size: Large adult)   Pulse 88   Temp 37.5 °C (99.5 °F) (Temporal)   Ht 1.905 m (6' 3\")   Wt 121 kg (267 lb 3.2 oz)   SpO2 98%   BMI 33.40 kg/m²      General: Well-developed, well-nourished male in no distress  HEENT: Conjuntiva and lids are within normal limits.  External auditory canals are within normal limits.  Tympanic membranes are clear with a normal light refflex.  Nasal mucosa is normal.  Oral pharynx is normal and free of exudate.  Neck: Supple, non-tender with nothyromegaly noted.  Lympatic: Pre and Post auricular, sub-mandibular, anterior and posterior cervical and supraclavicular areas are without notable lymphadenopathy  Lungs: Clear to auscultation and perucssion bilaterally with good respiratory effort.  No wheezes, crackes or rhonci are heard.  Heart: Normal rate, regular rhythm with no murmurs, rubs or gallops heard.  Abdomen: Soft, non-tender, non-distended with normal-active bowel sounds.  Nor organomegaly noted.  Extremites: No edema  Psych: Patient is awake, alert and oriented with recent and remote memory intact. Mood and affect are normal.       Assessment and Plan:     1. Chronic RUQ pain  - Patient states on and off RUQ pain for about 4-5 years  - Pain usually at night after he goes to bed  - Awaken by pain 2-3 hrs after going to bed usually after fatty foods. Pain is mild/moderate in intensity and is usually located mid abdomen or RUQ and radiates to his right shoulder.  - No known medical problems or recent labs or imaging  - Pain occurs every few months or so, most recent pain attack was 3 weeks ago and occurred two days in a row.  - Physical exam unremarkable  - I " explained to patient that gallbladder stones could be the reason of this pain attacks, so we will need to order imaging and lab work.  - He does not drink ETOH heavily.        PLAN:  - RUQ US  - Labs  - Avoid fatty foods  - Follow up in 2 weeks    2. Obesity (BMI 30-39.9)  - Recommended low fat diet  - DASH diet  - CTM weight  - Regular exercise          Problem List Items Addressed This Visit     Obesity (BMI 30-39.9)    Relevant Orders    HEMOGLOBIN A1C      Other Visit Diagnoses     Chronic RUQ pain        Relevant Orders    COMP METABOLIC PANEL    LIPASE    LIPID PROFILE    US-RUQ          Return in about 2 weeks (around 6/6/2022).    Patient Instructions   - Get lab work done  - Get RUQ US  - Follow up within  2 weeks      Signed by: Fish Sampson M.D.      Dr. Adrián M.D. PGY-2  [facility] [[email or web address]] [facility]

## 2022-05-28 ENCOUNTER — HOSPITAL ENCOUNTER (OUTPATIENT)
Dept: LAB | Facility: MEDICAL CENTER | Age: 32
End: 2022-05-28
Attending: STUDENT IN AN ORGANIZED HEALTH CARE EDUCATION/TRAINING PROGRAM
Payer: COMMERCIAL

## 2022-05-28 DIAGNOSIS — E66.9 OBESITY (BMI 30-39.9): ICD-10-CM

## 2022-05-28 DIAGNOSIS — R10.11 CHRONIC RUQ PAIN: ICD-10-CM

## 2022-05-28 DIAGNOSIS — G89.29 CHRONIC RUQ PAIN: ICD-10-CM

## 2022-05-28 LAB
ALBUMIN SERPL BCP-MCNC: 4.7 G/DL (ref 3.2–4.9)
ALBUMIN/GLOB SERPL: 1.7 G/DL
ALP SERPL-CCNC: 90 U/L (ref 30–99)
ALT SERPL-CCNC: 84 U/L (ref 2–50)
ANION GAP SERPL CALC-SCNC: 10 MMOL/L (ref 7–16)
AST SERPL-CCNC: 41 U/L (ref 12–45)
BILIRUB SERPL-MCNC: 1.2 MG/DL (ref 0.1–1.5)
BUN SERPL-MCNC: 13 MG/DL (ref 8–22)
CALCIUM SERPL-MCNC: 9.6 MG/DL (ref 8.5–10.5)
CHLORIDE SERPL-SCNC: 105 MMOL/L (ref 96–112)
CHOLEST SERPL-MCNC: 184 MG/DL (ref 100–199)
CO2 SERPL-SCNC: 24 MMOL/L (ref 20–33)
CREAT SERPL-MCNC: 1.15 MG/DL (ref 0.5–1.4)
EST. AVERAGE GLUCOSE BLD GHB EST-MCNC: 103 MG/DL
FASTING STATUS PATIENT QL REPORTED: NORMAL
GFR SERPLBLD CREATININE-BSD FMLA CKD-EPI: 87 ML/MIN/1.73 M 2
GLOBULIN SER CALC-MCNC: 2.7 G/DL (ref 1.9–3.5)
GLUCOSE SERPL-MCNC: 89 MG/DL (ref 65–99)
HBA1C MFR BLD: 5.2 % (ref 4–5.6)
HDLC SERPL-MCNC: 30 MG/DL
LDLC SERPL CALC-MCNC: 132 MG/DL
LIPASE SERPL-CCNC: 27 U/L (ref 11–82)
POTASSIUM SERPL-SCNC: 4.5 MMOL/L (ref 3.6–5.5)
PROT SERPL-MCNC: 7.4 G/DL (ref 6–8.2)
SODIUM SERPL-SCNC: 139 MMOL/L (ref 135–145)
TRIGL SERPL-MCNC: 112 MG/DL (ref 0–149)

## 2022-05-28 PROCEDURE — 80053 COMPREHEN METABOLIC PANEL: CPT

## 2022-05-28 PROCEDURE — 83690 ASSAY OF LIPASE: CPT

## 2022-05-28 PROCEDURE — 80061 LIPID PANEL: CPT

## 2022-05-28 PROCEDURE — 36415 COLL VENOUS BLD VENIPUNCTURE: CPT

## 2022-05-28 PROCEDURE — 83036 HEMOGLOBIN GLYCOSYLATED A1C: CPT

## 2022-06-09 ENCOUNTER — HOSPITAL ENCOUNTER (OUTPATIENT)
Dept: RADIOLOGY | Facility: MEDICAL CENTER | Age: 32
End: 2022-06-09
Attending: STUDENT IN AN ORGANIZED HEALTH CARE EDUCATION/TRAINING PROGRAM
Payer: COMMERCIAL

## 2022-06-09 DIAGNOSIS — G89.29 CHRONIC RUQ PAIN: ICD-10-CM

## 2022-06-09 DIAGNOSIS — R10.11 CHRONIC RUQ PAIN: ICD-10-CM

## 2022-06-09 PROCEDURE — 76705 ECHO EXAM OF ABDOMEN: CPT

## 2022-06-15 ENCOUNTER — OFFICE VISIT (OUTPATIENT)
Dept: MEDICAL GROUP | Facility: CLINIC | Age: 32
End: 2022-06-15
Payer: COMMERCIAL

## 2022-06-15 VITALS
BODY MASS INDEX: 32.08 KG/M2 | HEART RATE: 80 BPM | HEIGHT: 75 IN | DIASTOLIC BLOOD PRESSURE: 78 MMHG | WEIGHT: 258 LBS | RESPIRATION RATE: 12 BRPM | SYSTOLIC BLOOD PRESSURE: 128 MMHG | OXYGEN SATURATION: 95 %

## 2022-06-15 DIAGNOSIS — R10.11 RUQ ABDOMINAL PAIN: ICD-10-CM

## 2022-06-15 DIAGNOSIS — R74.01 ELEVATED ALANINE AMINOTRANSFERASE (ALT) LEVEL: ICD-10-CM

## 2022-06-15 DIAGNOSIS — E78.00 HYPERCHOLESTEROLEMIA: ICD-10-CM

## 2022-06-15 PROCEDURE — 99213 OFFICE O/P EST LOW 20 MIN: CPT | Mod: GE

## 2022-06-19 NOTE — PROGRESS NOTES
[facility]     PATIENT ID:  NAME:  Colin Hart  MRN:               [id number]  YOB: 1990    Date: 4:29 PM      Resident: Francisco Gamez MD     CC:  JOSE abdominal pain      HPI: Colin Hart is a 31 y.o. male who presents to clinic reporting a multiyear history of intermittent right upper quadrant abdominal pain.  He describes his pain as worse over the months of April to May of this year and was seen by another provider at that time.  Patient had a right upper quadrant ultrasound, lab work and was counseled on gallbladder disease as well as advised to change his diet and attempt regular exercise.  Patient states that he did have the ultrasound and lab work performed.  He is also made drastic lifestyle changes to decrease fats in his diet as well as start a daily exercise routine.  Patient states that over the past month he is instituted these changes with significant improvement in his symptoms.  He states that he has not had any further episodes of pain and is overall feeling better since changing his lifestyle.  Patient denies any recent chest pain, shortness of breath, fever, chills, nausea, vomiting, abdominal pain, diarrhea or any other complaints or concerns at this time. Patient states that he is happy with the lifestyle changes he is made and will continue these in the future.      REVIEW OF SYSTEMS:   Ten systems reviewed and were negative except as noted in the HPI.                PROBLEM LIST  Patient Active Problem List   Diagnosis   • Obesity (BMI 30-39.9)        PAST SURGICAL HISTORY:  History reviewed. No pertinent surgical history.    FAMILY HISTORY:  Family History   Problem Relation Age of Onset   • No Known Problems Mother    • Hypertension Father    • Dementia Maternal Grandmother    • Lung Cancer Maternal Grandfather        SOCIAL HISTORY:   Social History     Tobacco Use   • Smoking status: Never Smoker   • Smokeless tobacco: Current User   Substance Use Topics   •  "Alcohol use: Yes     Comment: occ       ALLERGIES:  No Known Allergies    OUTPATIENT MEDICATIONS:  No current outpatient medications on file.    PHYSICAL EXAM:  Vitals:    06/15/22 1052   BP: 128/78   BP Location: Right arm   Patient Position: Sitting   BP Cuff Size: Large adult   Pulse: 80   Resp: 12   SpO2: 95%   Weight: 117 kg (258 lb)   Height: 1.905 m (6' 3\")       General: Pt resting in NAD, pleasant and cooperative   Skin:  Pink, warm and dry.  HEENT: NC/AT. EOMI. Neck supple  Lungs:  Symmetrical.  CTAB, good air movement   Cardiovascular:  S1/S2 RRR, no murmur/rubs/gallops, warm and well perfused  Abdomen:  Abdomen is soft, nontender, no RUQ tenderness, negative Cornell sign   Extremities:  Full range of motion.  CNS:  Muscle tone is normal. No gross focal neurologic deficits      ASSESSMENT/PLAN:   31 y.o. male who presents to the clinic for follow-up on ultrasound and laboratory work performed for multiyear history of intermittent right upper quadrant abdominal pain which has been acutely worse throughout the month of April to May of this year.  Patient's laboratory work was grossly normal showing only a mild elevation in the ALT and slightly elevated LDL, low HDL. Right upper quadrant ultrasound shows normal gallbladder without evidence of cholelithiasis and no pericholecystic fluid.  Patient's liver appeared heterogeneously echogenic most compatible with fatty infiltration and borderline hepatomegaly.  I counseled the patient on these findings and advised him that given his heterogenously echogenic liver we could consider further laboratory testing to rule out other causes.  Patient states that he is happy with the changes that he is made in his lifestyle and the improvement in his symptoms at this time.  I advised the patient that if he develops any further symptoms that we should do some laboratory work to ensure no other form of organic liver disease.  I advised patient to continue with a healthy diet " and an exercise routine and that he should be followed yearly to evaluate his cholesterol in the future and ensure that these levels return to normal.  Patient denies any history of familial hypercholesterolemia and states that he does not have any risk factors for hepatitis.  Patient will follow-up as needed or at 1 year.      Visit Diagnoses     Hypercholesterolemia        RUQ abdominal pain        Elevated alanine aminotransferase (ALT) level              Francisco Gamez MD   PGY-1  [facility]

## 2023-02-01 ENCOUNTER — OFFICE VISIT (OUTPATIENT)
Dept: URGENT CARE | Facility: PHYSICIAN GROUP | Age: 33
End: 2023-02-01
Payer: COMMERCIAL

## 2023-02-01 VITALS
HEIGHT: 75 IN | BODY MASS INDEX: 29.59 KG/M2 | TEMPERATURE: 96.8 F | OXYGEN SATURATION: 98 % | WEIGHT: 238 LBS | SYSTOLIC BLOOD PRESSURE: 128 MMHG | HEART RATE: 89 BPM | DIASTOLIC BLOOD PRESSURE: 76 MMHG | RESPIRATION RATE: 16 BRPM

## 2023-02-01 DIAGNOSIS — R09.81 NASAL CONGESTION: ICD-10-CM

## 2023-02-01 DIAGNOSIS — J02.9 SORE THROAT: ICD-10-CM

## 2023-02-01 DIAGNOSIS — J00 ACUTE NASOPHARYNGITIS (COMMON COLD): ICD-10-CM

## 2023-02-01 LAB
INT CON NEG: NORMAL
INT CON POS: NORMAL
S PYO AG THROAT QL: NORMAL

## 2023-02-01 PROCEDURE — 87880 STREP A ASSAY W/OPTIC: CPT | Performed by: NURSE PRACTITIONER

## 2023-02-01 PROCEDURE — 99213 OFFICE O/P EST LOW 20 MIN: CPT | Performed by: NURSE PRACTITIONER

## 2023-02-01 ASSESSMENT — ENCOUNTER SYMPTOMS
EYE DISCHARGE: 0
HEADACHES: 0
DIARRHEA: 0
MYALGIAS: 0
ORTHOPNEA: 0
CHILLS: 0
FEVER: 0
NAUSEA: 0
SORE THROAT: 1
COUGH: 0

## 2023-02-01 NOTE — PROGRESS NOTES
Subjective     Colin Hart is a 32 y.o. male who presents with Congestion and Pharyngitis (X2-3 days )            HPI  New problem.  Patient is a 32-year-old male who presents with congestion and sore throat x2 to 3 days.  He denies cough, headache, body aches, nausea, or diarrhea.  He denies fever or chills.  He has been taking over-the-counter combination cough and cold for his symptoms with minimal relief.  He has done a home COVID test last night which came back negative.  Patient has no known allergies.  No current outpatient medications on file prior to visit.     No current facility-administered medications on file prior to visit.     Social History     Socioeconomic History    Marital status: Single     Spouse name: Not on file    Number of children: Not on file    Years of education: Not on file    Highest education level: Not on file   Occupational History    Not on file   Tobacco Use    Smoking status: Never    Smokeless tobacco: Current   Vaping Use    Vaping Use: Never used   Substance and Sexual Activity    Alcohol use: Yes     Comment: occ    Drug use: No    Sexual activity: Yes     Partners: Female     Birth control/protection: Condom   Other Topics Concern    Not on file   Social History Narrative    Not on file     Social Determinants of Health     Financial Resource Strain: Not on file   Food Insecurity: Not on file   Transportation Needs: Not on file   Physical Activity: Not on file   Stress: Not on file   Social Connections: Not on file   Intimate Partner Violence: Not on file   Housing Stability: Not on file     Breast Cancer-related family history is not on file.      Review of Systems   Constitutional:  Positive for malaise/fatigue. Negative for chills and fever.   HENT:  Positive for congestion and sore throat.    Eyes:  Negative for discharge.   Respiratory:  Negative for cough.    Cardiovascular:  Negative for chest pain and orthopnea.   Gastrointestinal:  Negative for diarrhea and  "nausea.   Musculoskeletal:  Negative for myalgias.   Neurological:  Negative for headaches.   Endo/Heme/Allergies:  Negative for environmental allergies.            Objective     /76   Pulse 89   Temp 36 °C (96.8 °F) (Temporal)   Resp 16   Ht 1.905 m (6' 3\")   Wt 108 kg (238 lb)   SpO2 98%   BMI 29.75 kg/m²      Physical Exam  Vitals and nursing note reviewed.   Constitutional:       General: He is not in acute distress.     Appearance: Normal appearance. He is well-developed.   HENT:      Head: Normocephalic and atraumatic.      Right Ear: Ear canal and external ear normal. No middle ear effusion. Tympanic membrane is not injected or perforated.      Left Ear: Ear canal and external ear normal.  No middle ear effusion. Tympanic membrane is not injected or perforated.      Nose: Mucosal edema, congestion and rhinorrhea present.      Mouth/Throat:      Mouth: Mucous membranes are moist.      Pharynx: Posterior oropharyngeal erythema present. No oropharyngeal exudate.   Eyes:      General:         Right eye: No discharge.         Left eye: No discharge.      Conjunctiva/sclera: Conjunctivae normal.   Cardiovascular:      Rate and Rhythm: Normal rate and regular rhythm.      Heart sounds: Normal heart sounds. No murmur heard.  Pulmonary:      Effort: Pulmonary effort is normal. No respiratory distress.      Breath sounds: Normal breath sounds.   Musculoskeletal:         General: Normal range of motion.      Cervical back: Normal range of motion and neck supple.      Comments: Normal movement of all 4 extremities.   Lymphadenopathy:      Cervical: No cervical adenopathy.      Upper Body:      Right upper body: No supraclavicular adenopathy.      Left upper body: No supraclavicular adenopathy.   Skin:     General: Skin is warm and dry.   Neurological:      Mental Status: He is alert and oriented to person, place, and time.      Gait: Gait normal.   Psychiatric:         Behavior: Behavior normal.         " Thought Content: Thought content normal.                           Assessment & Plan        1. Acute nasopharyngitis (common cold)        2. Sore throat  POCT Rapid Strep A      3. Nasal congestion  CANCELED: POCT SARS-COV Antigen MOAR (Symptomatic only)        Patient with negative strep.  Viral Infections  Your caregiver has diagnosed you with a viral infection. Viruses are the frequent causes of minor sore throats, aches and pains, runny noses, watery eyes, tiredness, some coughs. These problems (symptoms) DO NOT respond to medications that kill germs (antibiotics).  HOME CARE INSTRUCTIONS  Only take over-the-counter or prescription medicines for pain, discomfort, or fever as directed by your caregiver.   Drink clear fluids.   SIGNS OF A BACTERIAL (GERM) INFECTION FOLLOWING THE VIRUS USUALLY TAKING OVER 7-10 DAYS TO DEVELOP MAY BE:   Chills and a fever.   Severe headache.   Tenderness over sinuses.   Persistent extreme malaise (feeling unhealthy), muscle aches and fatigue (tiredness).   Persistent productive cough.   SEEK IMMEDIATE MEDICAL CARE IF:  You or your child has an oral temperature above 102° F (38.9° C), not controlled by medicine.   Your baby is older than 3 months with a rectal temperature of 102º F (38.9º C) or higher.   You can receive the resource “A Common Sense Guide to Antibiotics” free by calling [employer]  (1-689.561.5411).  MAKE SURE YOU:   Understand these instructions.   Will watch your condition.   Will get help right away if you are not doing well or get worse.   Document Released: 09/27/2006 Document Re-Released: 06/07/2011  [employer]® Patient Information ©2011 [employer].

## 2023-02-01 NOTE — LETTER
February 1, 2023        Colin Hart  [address]  [place] [address]        Colin was seen in our clinic and he is excused from work for today, tomorrow and Friday. Thank you.  If you have any questions or concerns, please don't hesitate to call.        Sincerely,        Henrique Turner A.P.N.    Electronically Signed

## 2023-04-30 SDOH — ECONOMIC STABILITY: HOUSING INSECURITY: IN THE LAST 12 MONTHS, HOW MANY PLACES HAVE YOU LIVED?: 1

## 2023-04-30 SDOH — HEALTH STABILITY: PHYSICAL HEALTH: ON AVERAGE, HOW MANY DAYS PER WEEK DO YOU ENGAGE IN MODERATE TO STRENUOUS EXERCISE (LIKE A BRISK WALK)?: 3 DAYS

## 2023-04-30 SDOH — ECONOMIC STABILITY: HOUSING INSECURITY
IN THE LAST 12 MONTHS, WAS THERE A TIME WHEN YOU DID NOT HAVE A STEADY PLACE TO SLEEP OR SLEPT IN A SHELTER (INCLUDING NOW)?: NO

## 2023-04-30 SDOH — ECONOMIC STABILITY: TRANSPORTATION INSECURITY
IN THE PAST 12 MONTHS, HAS LACK OF TRANSPORTATION KEPT YOU FROM MEETINGS, WORK, OR FROM GETTING THINGS NEEDED FOR DAILY LIVING?: NO

## 2023-04-30 SDOH — ECONOMIC STABILITY: FOOD INSECURITY: WITHIN THE PAST 12 MONTHS, THE FOOD YOU BOUGHT JUST DIDN'T LAST AND YOU DIDN'T HAVE MONEY TO GET MORE.: NEVER TRUE

## 2023-04-30 SDOH — HEALTH STABILITY: PHYSICAL HEALTH: ON AVERAGE, HOW MANY MINUTES DO YOU ENGAGE IN EXERCISE AT THIS LEVEL?: 30 MIN

## 2023-04-30 SDOH — ECONOMIC STABILITY: FOOD INSECURITY: WITHIN THE PAST 12 MONTHS, YOU WORRIED THAT YOUR FOOD WOULD RUN OUT BEFORE YOU GOT MONEY TO BUY MORE.: NEVER TRUE

## 2023-04-30 SDOH — ECONOMIC STABILITY: TRANSPORTATION INSECURITY
IN THE PAST 12 MONTHS, HAS LACK OF RELIABLE TRANSPORTATION KEPT YOU FROM MEDICAL APPOINTMENTS, MEETINGS, WORK OR FROM GETTING THINGS NEEDED FOR DAILY LIVING?: NO

## 2023-04-30 SDOH — ECONOMIC STABILITY: TRANSPORTATION INSECURITY
IN THE PAST 12 MONTHS, HAS THE LACK OF TRANSPORTATION KEPT YOU FROM MEDICAL APPOINTMENTS OR FROM GETTING MEDICATIONS?: NO

## 2023-04-30 SDOH — ECONOMIC STABILITY: INCOME INSECURITY: HOW HARD IS IT FOR YOU TO PAY FOR THE VERY BASICS LIKE FOOD, HOUSING, MEDICAL CARE, AND HEATING?: NOT HARD AT ALL

## 2023-04-30 SDOH — HEALTH STABILITY: MENTAL HEALTH
STRESS IS WHEN SOMEONE FEELS TENSE, NERVOUS, ANXIOUS, OR CAN'T SLEEP AT NIGHT BECAUSE THEIR MIND IS TROUBLED. HOW STRESSED ARE YOU?: ONLY A LITTLE

## 2023-04-30 SDOH — ECONOMIC STABILITY: INCOME INSECURITY: IN THE LAST 12 MONTHS, WAS THERE A TIME WHEN YOU WERE NOT ABLE TO PAY THE MORTGAGE OR RENT ON TIME?: NO

## 2023-04-30 ASSESSMENT — SOCIAL DETERMINANTS OF HEALTH (SDOH)
HOW OFTEN DO YOU GET TOGETHER WITH FRIENDS OR RELATIVES?: TWICE A WEEK
HOW OFTEN DO YOU HAVE SIX OR MORE DRINKS ON ONE OCCASION: LESS THAN MONTHLY
DO YOU BELONG TO ANY CLUBS OR ORGANIZATIONS SUCH AS CHURCH GROUPS UNIONS, FRATERNAL OR ATHLETIC GROUPS, OR SCHOOL GROUPS?: YES
DO YOU BELONG TO ANY CLUBS OR ORGANIZATIONS SUCH AS CHURCH GROUPS UNIONS, FRATERNAL OR ATHLETIC GROUPS, OR SCHOOL GROUPS?: YES
HOW OFTEN DO YOU ATTEND CHURCH OR RELIGIOUS SERVICES?: NEVER
HOW OFTEN DO YOU ATTENT MEETINGS OF THE CLUB OR ORGANIZATION YOU BELONG TO?: MORE THAN 4 TIMES PER YEAR
HOW OFTEN DO YOU ATTENT MEETINGS OF THE CLUB OR ORGANIZATION YOU BELONG TO?: MORE THAN 4 TIMES PER YEAR
WITHIN THE PAST 12 MONTHS, YOU WORRIED THAT YOUR FOOD WOULD RUN OUT BEFORE YOU GOT THE MONEY TO BUY MORE: NEVER TRUE
HOW OFTEN DO YOU ATTEND CHURCH OR RELIGIOUS SERVICES?: NEVER
IN A TYPICAL WEEK, HOW MANY TIMES DO YOU TALK ON THE PHONE WITH FAMILY, FRIENDS, OR NEIGHBORS?: MORE THAN THREE TIMES A WEEK
HOW OFTEN DO YOU HAVE A DRINK CONTAINING ALCOHOL: MONTHLY OR LESS
IN A TYPICAL WEEK, HOW MANY TIMES DO YOU TALK ON THE PHONE WITH FAMILY, FRIENDS, OR NEIGHBORS?: MORE THAN THREE TIMES A WEEK
HOW OFTEN DO YOU GET TOGETHER WITH FRIENDS OR RELATIVES?: TWICE A WEEK
HOW MANY DRINKS CONTAINING ALCOHOL DO YOU HAVE ON A TYPICAL DAY WHEN YOU ARE DRINKING: 5 OR 6
HOW HARD IS IT FOR YOU TO PAY FOR THE VERY BASICS LIKE FOOD, HOUSING, MEDICAL CARE, AND HEATING?: NOT HARD AT ALL

## 2023-04-30 ASSESSMENT — LIFESTYLE VARIABLES
HOW OFTEN DO YOU HAVE A DRINK CONTAINING ALCOHOL: MONTHLY OR LESS
AUDIT-C TOTAL SCORE: 4
HOW OFTEN DO YOU HAVE SIX OR MORE DRINKS ON ONE OCCASION: LESS THAN MONTHLY
HOW MANY STANDARD DRINKS CONTAINING ALCOHOL DO YOU HAVE ON A TYPICAL DAY: 5 OR 6
SKIP TO QUESTIONS 9-10: 0

## 2023-05-03 ENCOUNTER — OFFICE VISIT (OUTPATIENT)
Dept: MEDICAL GROUP | Facility: PHYSICIAN GROUP | Age: 33
End: 2023-05-03
Payer: COMMERCIAL

## 2023-05-03 VITALS
SYSTOLIC BLOOD PRESSURE: 114 MMHG | WEIGHT: 252.2 LBS | DIASTOLIC BLOOD PRESSURE: 60 MMHG | HEIGHT: 75 IN | TEMPERATURE: 97.9 F | OXYGEN SATURATION: 98 % | HEART RATE: 95 BPM | BODY MASS INDEX: 31.36 KG/M2

## 2023-05-03 DIAGNOSIS — E66.9 OBESITY (BMI 30-39.9): ICD-10-CM

## 2023-05-03 DIAGNOSIS — Z23 NEED FOR VACCINATION: ICD-10-CM

## 2023-05-03 DIAGNOSIS — R74.01 ELEVATED ALT MEASUREMENT: ICD-10-CM

## 2023-05-03 DIAGNOSIS — E78.5 DYSLIPIDEMIA: ICD-10-CM

## 2023-05-03 DIAGNOSIS — Z13.79 ENCOUNTER FOR GENETIC SCREENING: ICD-10-CM

## 2023-05-03 DIAGNOSIS — Z11.59 NEED FOR HEPATITIS C SCREENING TEST: ICD-10-CM

## 2023-05-03 PROCEDURE — 90471 IMMUNIZATION ADMIN: CPT

## 2023-05-03 PROCEDURE — 90715 TDAP VACCINE 7 YRS/> IM: CPT

## 2023-05-03 PROCEDURE — 99214 OFFICE O/P EST MOD 30 MIN: CPT | Mod: 25

## 2023-05-03 ASSESSMENT — ENCOUNTER SYMPTOMS
MYALGIAS: 0
DIARRHEA: 0
VOMITING: 0
CHILLS: 0
HEADACHES: 0
BLURRED VISION: 0
SHORTNESS OF BREATH: 0
CONSTIPATION: 0
NAUSEA: 0
WEIGHT LOSS: 0
WEAKNESS: 0
ABDOMINAL PAIN: 0
DIZZINESS: 0
COUGH: 0
FEVER: 0

## 2023-05-03 ASSESSMENT — PATIENT HEALTH QUESTIONNAIRE - PHQ9: CLINICAL INTERPRETATION OF PHQ2 SCORE: 0

## 2023-05-03 NOTE — ASSESSMENT & PLAN NOTE
Chronic condition stable  -Exercise: At least 150 minutes of moderate aerobic activity per week or 75 minutes of vigorous aerobic activity per week, +2 days/week of strength training  - Healthy lifestyle and eating habits: Mediterranean-based diet (rich in fruits, vegetables, nuts and healthy oils), proper hydration and avoiding sugary beverages, adequate sleep hygiene-(allowing 7 to 8 hours of overnight sleep).

## 2023-05-03 NOTE — PROGRESS NOTES
Subjective:     CC:  Diagnoses of Need for vaccination, Need for hepatitis C screening test, Encounter for genetic screening, Dyslipidemia, Elevated ALT measurement, and Obesity (BMI 30-39.9) were pertinent to this visit.    HISTORY OF THE PRESENT ILLNESS: Patient is a 32 y.o. male. This pleasant patient is here today to establish care and discuss the following problems:    Problem   Encounter for Genetic Screening    Patient and his wife are going to have a baby in August 2023.  Wife is a carrier for thalassemia minor.  They are requesting genetic testing.     Dyslipidemia    Lab Results   Component Value Date/Time    CHOLSTRLTOT 184 05/28/2022 09:11 AM    CHOLSTRLTOT 164 01/04/2005 02:35 PM     (H) 05/28/2022 09:11 AM    LDL 95 01/04/2005 02:35 PM    HDL 30 (A) 05/28/2022 09:11 AM    HDL 38 (L) 01/04/2005 02:35 PM    TRIGLYCERIDE 112 05/28/2022 09:11 AM    TRIGLYCERIDE 153 (H) 01/04/2005 02:35 PM            Elevated Alt Measurement    Patient was thought to have had exposure to hepatitis A.  He was not tested for this virus.  Patient is feeling improved since lab result was obtained.  No previous vaccination for hep A.   Latest Reference Range & Units 05/28/22 09:11   AST(SGOT) 12 - 45 U/L 41   ALT(SGPT) 2 - 50 U/L 84 (H)   Alkaline Phosphatase 30 - 99 U/L 90   Total Bilirubin 0.1 - 1.5 mg/dL 1.2   Albumin 3.2 - 4.9 g/dL 4.7        Obesity (Bmi 30-39.9)    Weight today in clinic is 252 pounds with BMI at 31.52         Health Maintenance: Completed    ROS:   Review of Systems   Constitutional:  Negative for chills, fever, malaise/fatigue and weight loss.   Eyes:  Negative for blurred vision.   Respiratory:  Negative for cough and shortness of breath.    Cardiovascular:  Negative for chest pain.   Gastrointestinal:  Negative for abdominal pain, constipation, diarrhea, nausea and vomiting.   Musculoskeletal:  Negative for myalgias.   Neurological:  Negative for dizziness, weakness and headaches.      "  Objective:     Exam: /60 (BP Location: Left arm, Patient Position: Sitting, BP Cuff Size: Large adult)   Pulse 95   Temp 36.6 °C (97.9 °F) (Temporal)   Ht 1.905 m (6' 3\")   Wt 114 kg (252 lb 3.2 oz)   SpO2 98%  Body mass index is 31.52 kg/m².    Physical Exam  Constitutional:       Appearance: Normal appearance.   HENT:      Head: Normocephalic.   Eyes:      Conjunctiva/sclera: Conjunctivae normal.      Pupils: Pupils are equal, round, and reactive to light.   Cardiovascular:      Rate and Rhythm: Normal rate and regular rhythm.      Heart sounds: No murmur heard.  Pulmonary:      Effort: Pulmonary effort is normal. No respiratory distress.      Breath sounds: Normal breath sounds.   Musculoskeletal:         General: Normal range of motion.   Skin:     General: Skin is warm and dry.   Neurological:      General: No focal deficit present.      Mental Status: He is alert and oriented to person, place, and time.   Psychiatric:         Mood and Affect: Mood normal.         Behavior: Behavior normal.         Labs:   Lab Results   Component Value Date/Time    CHOLSTRLTOT 184 05/28/2022 09:11 AM     (H) 05/28/2022 09:11 AM    HDL 30 (A) 05/28/2022 09:11 AM    TRIGLYCERIDE 112 05/28/2022 09:11 AM       Lab Results   Component Value Date/Time    SODIUM 139 05/28/2022 09:11 AM    POTASSIUM 4.5 05/28/2022 09:11 AM    CHLORIDE 105 05/28/2022 09:11 AM    CO2 24 05/28/2022 09:11 AM    GLUCOSE 89 05/28/2022 09:11 AM    BUN 13 05/28/2022 09:11 AM    CREATININE 1.15 05/28/2022 09:11 AM    CREATININE 0.9 03/26/2007 05:37 AM     Lab Results   Component Value Date/Time    ALKPHOSPHAT 90 05/28/2022 09:11 AM    ASTSGOT 41 05/28/2022 09:11 AM    ALTSGPT 84 (H) 05/28/2022 09:11 AM    TBILIRUBIN 1.2 05/28/2022 09:11 AM      Lab Results   Component Value Date/Time    HBA1C 5.2 05/28/2022 09:11 AM     No results found for: TSH  No results found for: FREET4      Assessment & Plan: Medical Decision Making   32 y.o. male with " the following -    Problem List Items Addressed This Visit       Obesity (BMI 30-39.9)     Chronic condition stable  -Exercise: At least 150 minutes of moderate aerobic activity per week or 75 minutes of vigorous aerobic activity per week, +2 days/week of strength training  - Healthy lifestyle and eating habits: Mediterranean-based diet (rich in fruits, vegetables, nuts and healthy oils), proper hydration and avoiding sugary beverages, adequate sleep hygiene-(allowing 7 to 8 hours of overnight sleep).           Encounter for genetic screening     Labs as follows:         Relevant Orders    HEMOGLOBINOPATHY PROFILE    Dyslipidemia     Chronic condition, uncontrolled  -Not on a statin at this time however lifestyle recommendations have been made as documented under obesity  -Surveillance labs ordered         Relevant Orders    CBC WITH DIFFERENTIAL    Comp Metabolic Panel    Lipid Profile    Elevated ALT measurement     Transient condition of uncertain progression  -We will obtain CMP to reevaluate ALT levels           Relevant Orders    CBC WITH DIFFERENTIAL    Comp Metabolic Panel     Other Visit Diagnoses       Need for vaccination        Relevant Orders    Tdap Vaccine =>8YO IM (Completed)    Need for hepatitis C screening test        Relevant Orders    HEP C VIRUS ANTIBODY            Differential diagnosis, natural history, supportive care, and indications for immediate follow-up discussed.  Shared decision making approach was utilized, and patient is amendable with plan of care.  Patient understands to return to clinic or go to the emergency department if symptoms worsen. All questions and concerns addressed to the best of my knowledge.      Return if symptoms worsen or fail to improve.    Please note that this dictation was created using voice recognition software. I have made every reasonable attempt to correct obvious errors, but I expect that there are errors of grammar and possibly content that I did not  discover before finalizing the note.

## 2023-05-03 NOTE — ASSESSMENT & PLAN NOTE
Chronic condition, uncontrolled  -Not on a statin at this time however lifestyle recommendations have been made as documented under obesity  -Surveillance labs ordered

## 2023-05-06 ENCOUNTER — HOSPITAL ENCOUNTER (OUTPATIENT)
Dept: LAB | Facility: MEDICAL CENTER | Age: 33
End: 2023-05-06
Payer: COMMERCIAL

## 2023-05-06 DIAGNOSIS — R74.01 ELEVATED ALT MEASUREMENT: ICD-10-CM

## 2023-05-06 DIAGNOSIS — Z13.79 ENCOUNTER FOR GENETIC SCREENING: ICD-10-CM

## 2023-05-06 DIAGNOSIS — E78.5 DYSLIPIDEMIA: ICD-10-CM

## 2023-05-06 DIAGNOSIS — Z11.59 NEED FOR HEPATITIS C SCREENING TEST: ICD-10-CM

## 2023-05-06 LAB
ALBUMIN SERPL BCP-MCNC: 4.6 G/DL (ref 3.2–4.9)
ALBUMIN/GLOB SERPL: 1.8 G/DL
ALP SERPL-CCNC: 81 U/L (ref 30–99)
ALT SERPL-CCNC: 33 U/L (ref 2–50)
ANION GAP SERPL CALC-SCNC: 12 MMOL/L (ref 7–16)
AST SERPL-CCNC: 21 U/L (ref 12–45)
BASOPHILS # BLD AUTO: 1.1 % (ref 0–1.8)
BASOPHILS # BLD: 0.06 K/UL (ref 0–0.12)
BILIRUB SERPL-MCNC: 1.3 MG/DL (ref 0.1–1.5)
BUN SERPL-MCNC: 14 MG/DL (ref 8–22)
CALCIUM ALBUM COR SERPL-MCNC: 8.7 MG/DL (ref 8.5–10.5)
CALCIUM SERPL-MCNC: 9.2 MG/DL (ref 8.5–10.5)
CHLORIDE SERPL-SCNC: 107 MMOL/L (ref 96–112)
CHOLEST SERPL-MCNC: 185 MG/DL (ref 100–199)
CO2 SERPL-SCNC: 23 MMOL/L (ref 20–33)
CREAT SERPL-MCNC: 1.05 MG/DL (ref 0.5–1.4)
EOSINOPHIL # BLD AUTO: 0.19 K/UL (ref 0–0.51)
EOSINOPHIL NFR BLD: 3.6 % (ref 0–6.9)
ERYTHROCYTE [DISTWIDTH] IN BLOOD BY AUTOMATED COUNT: 42.2 FL (ref 35.9–50)
FASTING STATUS PATIENT QL REPORTED: NORMAL
GFR SERPLBLD CREATININE-BSD FMLA CKD-EPI: 96 ML/MIN/1.73 M 2
GLOBULIN SER CALC-MCNC: 2.5 G/DL (ref 1.9–3.5)
GLUCOSE SERPL-MCNC: 85 MG/DL (ref 65–99)
HCT VFR BLD AUTO: 47.4 % (ref 42–52)
HCV AB SER QL: NORMAL
HDLC SERPL-MCNC: 35 MG/DL
HGB BLD-MCNC: 16.2 G/DL (ref 14–18)
IMM GRANULOCYTES # BLD AUTO: 0.06 K/UL (ref 0–0.11)
IMM GRANULOCYTES NFR BLD AUTO: 1.1 % (ref 0–0.9)
LDLC SERPL CALC-MCNC: 126 MG/DL
LYMPHOCYTES # BLD AUTO: 1.36 K/UL (ref 1–4.8)
LYMPHOCYTES NFR BLD: 25.8 % (ref 22–41)
MCH RBC QN AUTO: 31.4 PG (ref 27–33)
MCHC RBC AUTO-ENTMCNC: 34.2 G/DL (ref 33.7–35.3)
MCV RBC AUTO: 91.9 FL (ref 81.4–97.8)
MONOCYTES # BLD AUTO: 0.79 K/UL (ref 0–0.85)
MONOCYTES NFR BLD AUTO: 15 % (ref 0–13.4)
NEUTROPHILS # BLD AUTO: 2.82 K/UL (ref 1.82–7.42)
NEUTROPHILS NFR BLD: 53.4 % (ref 44–72)
NRBC # BLD AUTO: 0 K/UL
NRBC BLD-RTO: 0 /100 WBC
PLATELET # BLD AUTO: 213 K/UL (ref 164–446)
PMV BLD AUTO: 11.4 FL (ref 9–12.9)
POTASSIUM SERPL-SCNC: 4.4 MMOL/L (ref 3.6–5.5)
PROT SERPL-MCNC: 7.1 G/DL (ref 6–8.2)
RBC # BLD AUTO: 5.16 M/UL (ref 4.7–6.1)
SODIUM SERPL-SCNC: 142 MMOL/L (ref 135–145)
TRIGL SERPL-MCNC: 120 MG/DL (ref 0–149)
WBC # BLD AUTO: 5.3 K/UL (ref 4.8–10.8)

## 2023-05-06 PROCEDURE — 80061 LIPID PANEL: CPT

## 2023-05-06 PROCEDURE — 36415 COLL VENOUS BLD VENIPUNCTURE: CPT

## 2023-05-06 PROCEDURE — 85025 COMPLETE CBC W/AUTO DIFF WBC: CPT

## 2023-05-06 PROCEDURE — 83021 HEMOGLOBIN CHROMOTOGRAPHY: CPT

## 2023-05-06 PROCEDURE — 86803 HEPATITIS C AB TEST: CPT

## 2023-05-06 PROCEDURE — 80053 COMPREHEN METABOLIC PANEL: CPT

## 2023-05-09 LAB
HGB A1 MFR BLD: 96.3 % (ref 95–97.9)
HGB A2 MFR BLD: 3 % (ref 2–3.5)
HGB C MFR BLD: 0 % (ref 0–0)
HGB E MFR BLD: 0 % (ref 0–0)
HGB F MFR BLD: 0.7 % (ref 0–2.1)
HGB FRACT BLD ELPH-IMP: NORMAL
HGB OTHER MFR BLD: 0 % (ref 0–0)
HGB S BLD QL SOLY: NORMAL
HGB S MFR BLD: 0 % (ref 0–0)
PATH INTERP BLD-IMP: NORMAL

## 2023-05-10 DIAGNOSIS — R79.89 ABNORMAL CBC: ICD-10-CM

## 2024-09-24 ENCOUNTER — OFFICE VISIT (OUTPATIENT)
Dept: URGENT CARE | Facility: PHYSICIAN GROUP | Age: 34
End: 2024-09-24
Payer: COMMERCIAL

## 2024-09-24 VITALS
RESPIRATION RATE: 16 BRPM | DIASTOLIC BLOOD PRESSURE: 88 MMHG | TEMPERATURE: 97.4 F | BODY MASS INDEX: 32.08 KG/M2 | SYSTOLIC BLOOD PRESSURE: 122 MMHG | OXYGEN SATURATION: 98 % | HEART RATE: 94 BPM | WEIGHT: 258 LBS | HEIGHT: 75 IN

## 2024-09-24 DIAGNOSIS — H66.92 ACUTE OTITIS MEDIA, LEFT: ICD-10-CM

## 2024-09-24 PROCEDURE — 3079F DIAST BP 80-89 MM HG: CPT | Performed by: NURSE PRACTITIONER

## 2024-09-24 PROCEDURE — 99213 OFFICE O/P EST LOW 20 MIN: CPT | Performed by: NURSE PRACTITIONER

## 2024-09-24 PROCEDURE — 3074F SYST BP LT 130 MM HG: CPT | Performed by: NURSE PRACTITIONER

## 2024-09-24 RX ORDER — PREDNISONE 20 MG/1
20 TABLET ORAL DAILY
Qty: 5 TABLET | Refills: 0 | Status: SHIPPED | OUTPATIENT
Start: 2024-09-24 | End: 2024-09-29

## 2024-09-24 ASSESSMENT — FIBROSIS 4 INDEX: FIB4 SCORE: 0.57

## 2024-09-25 PROBLEM — H66.93 ACUTE OTITIS MEDIA, BILATERAL: Status: ACTIVE | Noted: 2024-09-25

## 2024-09-25 NOTE — PROGRESS NOTES
Subjective:     Colin Hart is a 33 y.o. male who presents for Otalgia ((L) x 1 week with popping. )      Otalgia       Pt presents for evaluation of a new problem. Colin is a very pleasant 33-year-old male who presents to urgent care today with complaints of left-sided ear pain that has been ongoing for the past week.  He states he has been suffering from an upper respiratory tract infection which has improved.  He does travel over the past as he drives to [place] frequently.  His symptoms do worsen with elevation.  Negative for drainage or fever.    Review of Systems   HENT:  Positive for ear pain.        PMH: No past medical history on file.  ALLERGIES: No Known Allergies  SURGHX:   Past Surgical History:   Procedure Laterality Date    ORIF, ELBOW Right     TONSILLECTOMY       SOCHX:   Social History     Socioeconomic History    Marital status:     Highest education level: 12th grade   Tobacco Use    Smoking status: Never    Smokeless tobacco: Current     Types: Chew   Vaping Use    Vaping status: Never Used   Substance and Sexual Activity    Alcohol use: Yes     Comment: occ    Drug use: No    Sexual activity: Yes     Partners: Female     Birth control/protection: Condom     Social Determinants of Health     Financial Resource Strain: Low Risk  (4/30/2023)    Overall Financial Resource Strain (CARDIA)     Difficulty of Paying Living Expenses: Not hard at all   Food Insecurity: No Food Insecurity (4/30/2023)    Hunger Vital Sign     Worried About Running Out of Food in the Last Year: Never true     Ran Out of Food in the Last Year: Never true   Transportation Needs: No Transportation Needs (4/30/2023)    PRAPARE - Transportation     Lack of Transportation (Medical): No     Lack of Transportation (Non-Medical): No   Physical Activity: Insufficiently Active (4/30/2023)    Exercise Vital Sign     Days of Exercise per Week: 3 days     Minutes of Exercise per Session: 30 min   Stress: No Stress  "Concern Present (4/30/2023)    [employer] - Occupational Stress Questionnaire     Feeling of Stress : Only a little   Social Connections: Moderately Integrated (4/30/2023)    Social Connection and Isolation Panel [NHANES]     Frequency of Communication with Friends and Family: More than three times a week     Frequency of Social Gatherings with Friends and Family: Twice a week     Attends Restorationism Services: Never     Active Member of Clubs or Organizations: Yes     Attends Club or Organization Meetings: More than 4 times per year     Marital Status:    Housing Stability: Low Risk  (4/30/2023)    Housing Stability Vital Sign     Unable to Pay for Housing in the Last Year: No     Number of Places Lived in the Last Year: 1     Unstable Housing in the Last Year: No     FH:   Family History   Problem Relation Age of Onset    Hypertension Mother     Hypertension Father     No Known Problems Sister     Dementia Maternal Grandmother     Lung Cancer Maternal Grandfather          Objective:   /88   Pulse 94   Temp 36.3 °C (97.4 °F) (Temporal)   Resp 16   Ht 1.905 m (6' 3\")   Wt 117 kg (258 lb)   SpO2 98%   BMI 32.25 kg/m²     Physical Exam  Vitals and nursing note reviewed.   Constitutional:       General: He is not in acute distress.     Appearance: Normal appearance. He is not ill-appearing.   HENT:      Head: Normocephalic and atraumatic.      Right Ear: External ear normal.      Left Ear: External ear normal. Tympanic membrane is injected and erythematous. Tympanic membrane is not bulging.      Nose: No congestion or rhinorrhea.      Mouth/Throat:      Mouth: Mucous membranes are moist.   Eyes:      Extraocular Movements: Extraocular movements intact.      Pupils: Pupils are equal, round, and reactive to light.   Cardiovascular:      Rate and Rhythm: Normal rate and regular rhythm.      Pulses: Normal pulses.      Heart sounds: Normal heart sounds.   Pulmonary:      " Effort: Pulmonary effort is normal.      Breath sounds: Normal breath sounds.   Abdominal:      General: Abdomen is flat. Bowel sounds are normal.      Palpations: Abdomen is soft.      Tenderness: There is abdominal tenderness. There is no right CVA tenderness or left CVA tenderness.   Musculoskeletal:         General: Normal range of motion.      Cervical back: Normal range of motion and neck supple.   Skin:     General: Skin is warm and dry.      Capillary Refill: Capillary refill takes less than 2 seconds.   Neurological:      General: No focal deficit present.      Mental Status: He is alert and oriented to person, place, and time. Mental status is at baseline.   Psychiatric:         Mood and Affect: Mood normal.         Behavior: Behavior normal.         Thought Content: Thought content normal.         Judgment: Judgment normal.         Assessment/Plan:   Assessment    1. Acute otitis media, left  amoxicillin-clavulanate (AUGMENTIN) 875-125 MG Tab    predniSONE (DELTASONE) 20 MG Tab        Patient was treated for acute otitis media of the left ear with Augmentin.  Prednisone was also prescribed 20 mg x 7 days for relief of inflammation.  We did discuss use of ibuprofen and Tylenol as needed for relief of discomfort.  Patient to return for worsening or persistent symptoms.

## 2024-10-05 ENCOUNTER — APPOINTMENT (OUTPATIENT)
Dept: URGENT CARE | Facility: PHYSICIAN GROUP | Age: 34
End: 2024-10-05
Payer: COMMERCIAL

## 2024-10-07 ENCOUNTER — OFFICE VISIT (OUTPATIENT)
Dept: URGENT CARE | Facility: PHYSICIAN GROUP | Age: 34
End: 2024-10-07
Payer: COMMERCIAL

## 2024-10-07 VITALS
WEIGHT: 251 LBS | HEART RATE: 102 BPM | SYSTOLIC BLOOD PRESSURE: 114 MMHG | RESPIRATION RATE: 16 BRPM | BODY MASS INDEX: 31.21 KG/M2 | TEMPERATURE: 98.1 F | OXYGEN SATURATION: 97 % | DIASTOLIC BLOOD PRESSURE: 78 MMHG | HEIGHT: 75 IN

## 2024-10-07 DIAGNOSIS — H66.92 ACUTE OTITIS MEDIA, LEFT: ICD-10-CM

## 2024-10-07 PROCEDURE — 3078F DIAST BP <80 MM HG: CPT | Performed by: FAMILY MEDICINE

## 2024-10-07 PROCEDURE — 3074F SYST BP LT 130 MM HG: CPT | Performed by: FAMILY MEDICINE

## 2024-10-07 PROCEDURE — 99213 OFFICE O/P EST LOW 20 MIN: CPT | Performed by: FAMILY MEDICINE

## 2024-10-07 RX ORDER — METHYLPREDNISOLONE 4 MG/1
TABLET ORAL
Qty: 21 TABLET | Refills: 0 | Status: SHIPPED | OUTPATIENT
Start: 2024-10-07

## 2024-10-07 ASSESSMENT — FIBROSIS 4 INDEX: FIB4 SCORE: 0.57

## 2024-11-07 ENCOUNTER — PHARMACY VISIT (OUTPATIENT)
Dept: PHARMACY | Facility: MEDICAL CENTER | Age: 34
End: 2024-11-07
Payer: COMMERCIAL

## 2024-11-07 ENCOUNTER — HOSPITAL ENCOUNTER (EMERGENCY)
Facility: MEDICAL CENTER | Age: 34
End: 2024-11-07
Attending: STUDENT IN AN ORGANIZED HEALTH CARE EDUCATION/TRAINING PROGRAM
Payer: COMMERCIAL

## 2024-11-07 VITALS
WEIGHT: 263.01 LBS | SYSTOLIC BLOOD PRESSURE: 124 MMHG | BODY MASS INDEX: 32.7 KG/M2 | DIASTOLIC BLOOD PRESSURE: 82 MMHG | RESPIRATION RATE: 16 BRPM | HEIGHT: 75 IN | OXYGEN SATURATION: 98 % | HEART RATE: 85 BPM | TEMPERATURE: 98 F

## 2024-11-07 DIAGNOSIS — H83.02 LABYRINTHITIS OF LEFT EAR: ICD-10-CM

## 2024-11-07 LAB — EKG IMPRESSION: NORMAL

## 2024-11-07 PROCEDURE — 93005 ELECTROCARDIOGRAM TRACING: CPT | Performed by: STUDENT IN AN ORGANIZED HEALTH CARE EDUCATION/TRAINING PROGRAM

## 2024-11-07 PROCEDURE — 99283 EMERGENCY DEPT VISIT LOW MDM: CPT

## 2024-11-07 PROCEDURE — 93005 ELECTROCARDIOGRAM TRACING: CPT

## 2024-11-07 PROCEDURE — RXMED WILLOW AMBULATORY MEDICATION CHARGE: Performed by: STUDENT IN AN ORGANIZED HEALTH CARE EDUCATION/TRAINING PROGRAM

## 2024-11-07 RX ORDER — MECLIZINE HYDROCHLORIDE 25 MG/1
25 TABLET ORAL 3 TIMES DAILY PRN
Qty: 30 TABLET | Refills: 0 | Status: SHIPPED | OUTPATIENT
Start: 2024-11-07

## 2024-11-07 RX ORDER — PREDNISONE 10 MG/1
TABLET ORAL
Qty: 32 TABLET | Refills: 0 | Status: SHIPPED | OUTPATIENT
Start: 2024-11-07 | End: 2024-11-10

## 2024-11-07 ASSESSMENT — PAIN DESCRIPTION - PAIN TYPE: TYPE: ACUTE PAIN

## 2024-11-07 ASSESSMENT — FIBROSIS 4 INDEX: FIB4 SCORE: 0.57

## 2024-11-08 NOTE — ED PROVIDER NOTES
ED Provider Note    CHIEF COMPLAINT  Chief Complaint   Patient presents with    Dizziness     X 6 pm     Ear Pain     X 2 months        EXTERNAL RECORDS REVIEWED  Outpatient Notes seen at [place] [facility] 10/7/2024 prescribed steroids and antibiotics for acute otitis media    HPI/ROS  LIMITATION TO HISTORY   Select: : None  OUTSIDE HISTORIAN(S):      Colin Hart is a 33 y.o. male who presents with sensation of ear fullness worsening over the past couple of days.  Patient reports also becoming acutely dizzy on multiple occasions today.  These episodes do self resolve and are typically provoked with positional change.  Patient reports a history of vertigo in the past that has responded to meclizine.  He also reports he gets yearly ear infections since he was a kid all the way into his adult years.  He used to see an ENT but this was quite sometime ago.  He was prescribed antibiotics and steroids about a month ago for similar symptoms and it did help however he has noted the symptoms to be recurring.  He denies any associated fevers at this time.    PAST MEDICAL HISTORY       SURGICAL HISTORY   has a past surgical history that includes tonsillectomy and orif, elbow (Right).    FAMILY HISTORY  Family History   Problem Relation Age of Onset    Hypertension Mother     Hypertension Father     No Known Problems Sister     Dementia Maternal Grandmother     Lung Cancer Maternal Grandfather        SOCIAL HISTORY  Social History     Tobacco Use    Smoking status: Never    Smokeless tobacco: Current     Types: Chew   Vaping Use    Vaping status: Never Used   Substance and Sexual Activity    Alcohol use: Yes     Comment: ocassionaly    Drug use: No    Sexual activity: Yes     Partners: Female     Birth control/protection: Condom       CURRENT MEDICATIONS  Home Medications       Reviewed by Amelia Sanches R.N. (Registered Nurse) on 11/07/24 at 1951  Med List Status: Partial     Medication Last Dose Status  "  methylPREDNISolone (MEDROL DOSEPAK) 4 MG Tablet Therapy Pack  Active                    ALLERGIES  No Known Allergies    PHYSICAL EXAM  VITAL SIGNS: /82   Pulse 85   Temp 36.7 °C (98 °F) (Temporal)   Resp 16   Ht 1.905 m (6' 3\")   Wt 119 kg (263 lb 0.1 oz)   SpO2 98%   BMI 32.87 kg/m²    Physical Exam  Vitals and nursing note reviewed.   Constitutional:       Appearance: He is well-developed.   HENT:      Head: Normocephalic.      Right Ear: Tympanic membrane and ear canal normal.      Left Ear: Tympanic membrane and ear canal normal.      Ears:      Comments: Bilateral TMs have scarring.  There is no bulging or erythema present  Cardiovascular:      Rate and Rhythm: Normal rate and regular rhythm.      Heart sounds: No murmur heard.  Pulmonary:      Effort: Pulmonary effort is normal.      Breath sounds: Normal breath sounds.   Abdominal:      Palpations: Abdomen is soft.      Tenderness: There is no abdominal tenderness.   Musculoskeletal:         General: Normal range of motion.      Right lower leg: No edema.      Left lower leg: No edema.   Skin:     General: Skin is warm.   Neurological:      General: No focal deficit present.      Mental Status: He is alert and oriented to person, place, and time.               COURSE & MEDICAL DECISION MAKING    ASSESSMENT, COURSE AND PLAN  Care Narrative: 33-year-old male history of recurrent otitis media who presents to the ED with intermittent provoked vertiginous dizziness.  Exam is overall benign.  I suspect the patient has labyrinthitis given recent otitis media.  Will treat with prednisone taper and meclizine.  I have referred the patient to ENT given recurrent infections and recurrent issues with his ears especially at this age.  Return precautions given for worsening              ADDITIONAL PROBLEMS MANAGED  History reviewed. No pertinent past medical history.    DISPOSITION AND DISCUSSIONS      Escalation of care considered, and ultimately not " performed:Laboratory analysis    Barriers to care at this time, including but not limited to:  [place] .     Decision tools and prescription drugs considered including, but not limited to: Corticosteroids prednisone.    FINAL DIAGNOSIS  1. Labyrinthitis of left ear         Electronically signed by: Kevon Mitchell M.D., 11/7/2024 11:16 PM

## 2024-11-08 NOTE — ED TRIAGE NOTES
"33 y.o. male Colin Hart  Chief Complaint   Patient presents with    Dizziness     X 6 pm     Ear Pain     X 2 months      Patient presented to ED with complaints of bilateral ear pain for 1 month and dizziness since 6 pm, patient endorses he was placed on antibiotic twice for ear infection but the pain never subsided.     Patient AAO X4 , Respirations even and unlabored on room air , afebrile at this time. Ambulatory to triage with wife.     /84   Pulse 85   Temp 36.6 °C (97.9 °F) (Oral)   Resp 16   Ht 1.905 m (6' 3\")   Wt 119 kg (263 lb 0.1 oz)   SpO2 98%   BMI 32.87 kg/m²     No Known Allergies    Past Surgical History:   Procedure Laterality Date    ORIF, ELBOW Right     TONSILLECTOMY       Pt made aware of triage process, placed back into lobby, educated pt to tell staff of any worsening of symptoms       "

## 2024-11-10 RX ORDER — PREDNISONE 10 MG/1
TABLET ORAL
Qty: 41 TABLET | Refills: 0 | Status: SHIPPED | OUTPATIENT
Start: 2024-11-10 | End: 2024-11-10

## 2024-11-10 RX ORDER — PREDNISONE 10 MG/1
TABLET ORAL
Qty: 41 TABLET | Refills: 0 | Status: SHIPPED | OUTPATIENT
Start: 2024-11-10 | End: 2024-11-20

## 2024-11-10 NOTE — DISCHARGE PLANNING
Pt calls and LVM re: steroid taper Rx not having enough pills to complete.    Discussed with covering ERP.    New Rx sent.

## 2024-11-16 ENCOUNTER — APPOINTMENT (OUTPATIENT)
Dept: RADIOLOGY | Facility: MEDICAL CENTER | Age: 34
End: 2024-11-16
Attending: EMERGENCY MEDICINE
Payer: COMMERCIAL

## 2024-11-16 ENCOUNTER — HOSPITAL ENCOUNTER (EMERGENCY)
Facility: MEDICAL CENTER | Age: 34
End: 2024-11-16
Attending: EMERGENCY MEDICINE
Payer: COMMERCIAL

## 2024-11-16 VITALS
HEIGHT: 75 IN | RESPIRATION RATE: 12 BRPM | DIASTOLIC BLOOD PRESSURE: 88 MMHG | TEMPERATURE: 98.4 F | HEART RATE: 89 BPM | WEIGHT: 261.25 LBS | BODY MASS INDEX: 32.48 KG/M2 | OXYGEN SATURATION: 97 % | SYSTOLIC BLOOD PRESSURE: 139 MMHG

## 2024-11-16 DIAGNOSIS — H65.93 MIDDLE EAR EFFUSION, BILATERAL: ICD-10-CM

## 2024-11-16 DIAGNOSIS — H57.02 ANISOCORIA: ICD-10-CM

## 2024-11-16 DIAGNOSIS — R00.2 PALPITATIONS: ICD-10-CM

## 2024-11-16 DIAGNOSIS — R51.9 NONINTRACTABLE HEADACHE, UNSPECIFIED CHRONICITY PATTERN, UNSPECIFIED HEADACHE TYPE: ICD-10-CM

## 2024-11-16 LAB
ALBUMIN SERPL BCP-MCNC: 4.4 G/DL (ref 3.2–4.9)
ALBUMIN/GLOB SERPL: 1.6 G/DL
ALP SERPL-CCNC: 90 U/L (ref 30–99)
ALT SERPL-CCNC: 64 U/L (ref 2–50)
ANION GAP SERPL CALC-SCNC: 10 MMOL/L (ref 7–16)
AST SERPL-CCNC: 26 U/L (ref 12–45)
BASOPHILS # BLD AUTO: 0 % (ref 0–1.8)
BASOPHILS # BLD: 0 K/UL (ref 0–0.12)
BILIRUB SERPL-MCNC: 0.6 MG/DL (ref 0.1–1.5)
BUN SERPL-MCNC: 18 MG/DL (ref 8–22)
CALCIUM ALBUM COR SERPL-MCNC: 9 MG/DL (ref 8.5–10.5)
CALCIUM SERPL-MCNC: 9.3 MG/DL (ref 8.4–10.2)
CHLORIDE SERPL-SCNC: 103 MMOL/L (ref 96–112)
CO2 SERPL-SCNC: 26 MMOL/L (ref 20–33)
CREAT SERPL-MCNC: 0.85 MG/DL (ref 0.5–1.4)
EKG IMPRESSION: NORMAL
EOSINOPHIL # BLD AUTO: 0.13 K/UL (ref 0–0.51)
EOSINOPHIL NFR BLD: 2 % (ref 0–6.9)
ERYTHROCYTE [DISTWIDTH] IN BLOOD BY AUTOMATED COUNT: 43.1 FL (ref 35.9–50)
GFR SERPLBLD CREATININE-BSD FMLA CKD-EPI: 117 ML/MIN/1.73 M 2
GLOBULIN SER CALC-MCNC: 2.7 G/DL (ref 1.9–3.5)
GLUCOSE SERPL-MCNC: 108 MG/DL (ref 65–99)
HCT VFR BLD AUTO: 45.4 % (ref 42–52)
HGB BLD-MCNC: 15.6 G/DL (ref 14–18)
LG PLATELETS BLD QL SMEAR: NORMAL
LYMPHOCYTES # BLD AUTO: 1.07 K/UL (ref 1–4.8)
LYMPHOCYTES NFR BLD: 17 % (ref 22–41)
MAGNESIUM SERPL-MCNC: 2.1 MG/DL (ref 1.5–2.5)
MANUAL DIFF BLD: NORMAL
MCH RBC QN AUTO: 31.5 PG (ref 27–33)
MCHC RBC AUTO-ENTMCNC: 34.4 G/DL (ref 32.3–36.5)
MCV RBC AUTO: 91.7 FL (ref 81.4–97.8)
METAMYELOCYTES NFR BLD MANUAL: 3 %
MONOCYTES # BLD AUTO: 0.95 K/UL (ref 0–0.85)
MONOCYTES NFR BLD AUTO: 15 % (ref 0–13.4)
MYELOCYTES NFR BLD MANUAL: 2 %
NEUTROPHILS # BLD AUTO: 3.84 K/UL (ref 1.82–7.42)
NEUTROPHILS NFR BLD: 59 % (ref 44–72)
NEUTS BAND NFR BLD MANUAL: 2 % (ref 0–10)
NRBC # BLD AUTO: 0 K/UL
NRBC BLD-RTO: 0 /100 WBC (ref 0–0.2)
PHOSPHATE SERPL-MCNC: 2.6 MG/DL (ref 2.5–4.5)
PLATELET # BLD AUTO: 168 K/UL (ref 164–446)
PLATELET BLD QL SMEAR: NORMAL
PMV BLD AUTO: 10.5 FL (ref 9–12.9)
POTASSIUM SERPL-SCNC: 3.9 MMOL/L (ref 3.6–5.5)
PROT SERPL-MCNC: 7.1 G/DL (ref 6–8.2)
RBC # BLD AUTO: 4.95 M/UL (ref 4.7–6.1)
RBC BLD AUTO: NORMAL
SODIUM SERPL-SCNC: 139 MMOL/L (ref 135–145)
TSH SERPL DL<=0.005 MIU/L-ACNC: 1.3 UIU/ML (ref 0.38–5.33)
WBC # BLD AUTO: 6.3 K/UL (ref 4.8–10.8)

## 2024-11-16 PROCEDURE — 83735 ASSAY OF MAGNESIUM: CPT

## 2024-11-16 PROCEDURE — 93005 ELECTROCARDIOGRAM TRACING: CPT | Performed by: EMERGENCY MEDICINE

## 2024-11-16 PROCEDURE — 700117 HCHG RX CONTRAST REV CODE 255: Performed by: EMERGENCY MEDICINE

## 2024-11-16 PROCEDURE — 70496 CT ANGIOGRAPHY HEAD: CPT

## 2024-11-16 PROCEDURE — 85007 BL SMEAR W/DIFF WBC COUNT: CPT

## 2024-11-16 PROCEDURE — 93005 ELECTROCARDIOGRAM TRACING: CPT

## 2024-11-16 PROCEDURE — 85027 COMPLETE CBC AUTOMATED: CPT

## 2024-11-16 PROCEDURE — 700111 HCHG RX REV CODE 636 W/ 250 OVERRIDE (IP): Mod: JZ | Performed by: EMERGENCY MEDICINE

## 2024-11-16 PROCEDURE — 36415 COLL VENOUS BLD VENIPUNCTURE: CPT

## 2024-11-16 PROCEDURE — 99284 EMERGENCY DEPT VISIT MOD MDM: CPT

## 2024-11-16 PROCEDURE — 80053 COMPREHEN METABOLIC PANEL: CPT

## 2024-11-16 PROCEDURE — 84100 ASSAY OF PHOSPHORUS: CPT

## 2024-11-16 PROCEDURE — 96375 TX/PRO/DX INJ NEW DRUG ADDON: CPT

## 2024-11-16 PROCEDURE — 96374 THER/PROPH/DIAG INJ IV PUSH: CPT

## 2024-11-16 PROCEDURE — 84443 ASSAY THYROID STIM HORMONE: CPT

## 2024-11-16 RX ORDER — KETOROLAC TROMETHAMINE 15 MG/ML
15 INJECTION, SOLUTION INTRAMUSCULAR; INTRAVENOUS ONCE
Status: COMPLETED | OUTPATIENT
Start: 2024-11-16 | End: 2024-11-16

## 2024-11-16 RX ORDER — PROCHLORPERAZINE EDISYLATE 5 MG/ML
10 INJECTION INTRAMUSCULAR; INTRAVENOUS ONCE
Status: COMPLETED | OUTPATIENT
Start: 2024-11-16 | End: 2024-11-16

## 2024-11-16 RX ORDER — DIPHENHYDRAMINE HYDROCHLORIDE 50 MG/ML
25 INJECTION INTRAMUSCULAR; INTRAVENOUS ONCE
Status: COMPLETED | OUTPATIENT
Start: 2024-11-16 | End: 2024-11-16

## 2024-11-16 RX ADMIN — IOHEXOL 90 ML: 350 INJECTION, SOLUTION INTRAVENOUS at 19:10

## 2024-11-16 RX ADMIN — PROCHLORPERAZINE EDISYLATE 10 MG: 5 INJECTION INTRAMUSCULAR; INTRAVENOUS at 18:40

## 2024-11-16 RX ADMIN — DIPHENHYDRAMINE HYDROCHLORIDE 25 MG: 50 INJECTION, SOLUTION INTRAMUSCULAR; INTRAVENOUS at 18:44

## 2024-11-16 RX ADMIN — KETOROLAC TROMETHAMINE 15 MG: 15 INJECTION, SOLUTION INTRAMUSCULAR; INTRAVENOUS at 18:42

## 2024-11-16 ASSESSMENT — FIBROSIS 4 INDEX: FIB4 SCORE: 0.57

## 2024-11-17 NOTE — ED TRIAGE NOTES
"Chief Complaint   Patient presents with    Palpitations     Denies CP. Endorses some SOB.     Numbness     L lower face. Reports this started approx 1h PTA. Reports h/a as well \"all day long\". No unilateral deficit is appreciated. Denies numbness/tingling to L arm or leg.     Dizziness     X2-3 weeks. Reports recent dx with ear infection and also given meclizine for dizziness.      Physical Exam  Pulmonary:      Effort: Pulmonary effort is normal.   Skin:     General: Skin is warm and dry.   Neurological:      Mental Status: He is alert.       BP (!) 152/93   Pulse 97   Temp 36.9 °C (98.4 °F) (Temporal)   Resp 18   Ht 1.905 m (6' 3\")   Wt 118 kg (261 lb 3.9 oz)   SpO2 95%   BMI 32.65 kg/m²     "

## 2024-11-17 NOTE — ED NOTES
Patient discharged in ambulatory state after verbally confirming understanding of discharge paperwork and education provided by ER physician. Patient advised to return to the ER for any worsening pain of any other symptoms.

## 2024-11-17 NOTE — DISCHARGE INSTRUCTIONS
Today your blood work and head CT were very reassuring.  Your EKG also was reassuring.  To try and help with the fluid behind your ears I would start doing some nasal rinses and Flonase (fluticasone) nasal spray.  Remember to stay hydrated.  Use Tylenol or ibuprofen if your headache comes back.  If you are noticing that the visual changes or the palpitations are getting worse please come back to the ER so we can reevaluate you.

## 2024-11-17 NOTE — ED PROVIDER NOTES
"ED Provider Note    CHIEF COMPLAINT  Chief Complaint   Patient presents with    Palpitations     Denies CP. Endorses some SOB.     Numbness     L lower face. Reports this started approx 1h PTA. Reports h/a as well \"all day long\". No unilateral deficit is appreciated. Denies numbness/tingling to L arm or leg.     Dizziness     X2-3 weeks. Reports recent dx with ear infection and also given meclizine for dizziness.        EXTERNAL RECORDS REVIEWED  Outpatient Notes seen at urgent care 10/7/2024 prescribed steroids and antibiotics for acute otitis media.  Before that he was seen on 9/24/2024 for similar ear infection and also prescribed Augmentin and prednisone.  Then seen in the ER on 11/7 after the urgent care due to dizziness.  Diagnosed with labyrinthitis and prescribed meclizine and prednisone.    HPI/ROS  LIMITATION TO HISTORY   Select: : None  OUTSIDE HISTORIAN(S):  None    Colin Hart is a 33 y.o. male who presents to the ER for headache, dizziness, diminished sensation on the left side of the face, vague visual disturbance and palpitations.  He has had ongoing ear issues for the past month, diagnosed with otitis media and also ear effusions and has done multiple courses of steroids and a course of antibiotics.  Ears feel a little bit full, does have some nasal congestion.  Not doing any sinus rinses or corticosteroids.  Earlier today started getting palpitations, tachycardia, got kind of sweaty and dizzy and got a left-sided retro-orbital headache.  Feels like his vision is slightly off but unable to describe too much.  They also did notice new anisocoria with right pupil greater than the left    PAST MEDICAL HISTORY   has a past medical history of Patient denies medical problems.    SURGICAL HISTORY   has a past surgical history that includes tonsillectomy and orif, elbow (Right).    FAMILY HISTORY  Family History   Problem Relation Age of Onset    Hypertension Mother     Hypertension Father     No " "Known Problems Sister     Dementia Maternal Grandmother     Lung Cancer Maternal Grandfather        SOCIAL HISTORY  Social History     Tobacco Use    Smoking status: Never    Smokeless tobacco: Current     Types: Chew   Vaping Use    Vaping status: Never Used   Substance and Sexual Activity    Alcohol use: Yes     Comment: ocassionaly    Drug use: No    Sexual activity: Yes     Partners: Female     Birth control/protection: Condom       CURRENT MEDICATIONS  Home Medications       Reviewed by Sherine Galvan R.N. (Registered Nurse) on 11/16/24 at 1733  Med List Status: Not Addressed     Medication Last Dose Status   meclizine (ANTIVERT) 25 MG Tab  Active   methylPREDNISolone (MEDROL DOSEPAK) 4 MG Tablet Therapy Pack  Active   predniSONE (DELTASONE) 10 MG Tab  Active                    ALLERGIES  No Known Allergies    PHYSICAL EXAM  VITAL SIGNS: /88   Pulse 89   Temp 36.9 °C (98.4 °F) (Temporal)   Resp 12   Ht 1.905 m (6' 3\")   Wt 118 kg (261 lb 3.9 oz)   SpO2 97%   BMI 32.65 kg/m²    General: Sitting calmly in stretcher, no distress  HEENT: NCAT, bilateral TMs with serous effusions but no erythema or purulence, no scarring, anisocoria right pupil is 5 mm and reactive, left pupil is 3 to 4 mm and reactive, EOMI without palsy  Neck: No cervical lymphadenopathy  CV: Regular rate and rhythm no murmurs  Pulmonary: Breathing comfortably on room air with clear breath sounds  Abdomen: Soft nondistended nontender  Neuro: Alert and oriented x 4, GCS 15.  Cranial nerves II through XII intact with the exception of slightly diminished sensation to the left V2 and V3 distribution on face.  5/5 strength in deltoids, biceps, triceps, , hip flexors, knee extension/flexion, ankle dorsiflexion and plantarflexion.  Sensation to light touch in all extremities is intact.  No dysmetria on finger-nose-finger or heel shin.    EKG/LABS  EKG 1724 NSR rate 95 with normal axis and normal intervals, inferior Q waves with deep " Q-wave in lead III, T wave inversion in 3 and aVF, no STEMI  I have independently interpreted this EKG    RADIOLOGY/PROCEDURES   I have independently interpreted the diagnostic imaging associated with this visit and am waiting the final reading from the radiologist.   My preliminary interpretation is as follows: CTV is normal no sign of venous thrombosis    Radiologist interpretation:  CT-CTV HEAD WITH & W/O POST PROCESS   Final Result      1.  There is no evidence of dural venous sinus thrombosis.   2.  No CT evidence of acute infarct, hemorrhage or mass.   3.  No aneurysm, occlusion or high-grade stenosis in the [place].          COURSE & MEDICAL DECISION MAKING    ASSESSMENT, COURSE AND PLAN  Care Narrative: Differential includes arrhythmia, hyperthyroid, electrolyte derangement, migraine, cluster headache, tension headache, cerebral venous thrombosis, malignant otitis, vertigo, ear effusions    On arrival patient is well-appearing, slightly hypertensive 152/93.  Appears well and has normal cardiopulmonary exam.  Bilateral TMs significant for serous effusions no sign of active infection or scarring.  Does have anisocoria that he states is new right pupil slightly bigger but both are reactive.  Other than slightly diminished sensation to the V2 and V3 distribution on the left side of the face there are no neurodeficits.  Differential above considered.  Given ongoing head/neck infection over the past month and new neurologic complaints we will obtain CTV of the head to rule out venous thrombosis.  We will also treat with headache cocktail of Toradol, Benadryl and Compazine.  Labs were obtained CBC normal, CMP unremarkable, mag and Phos were normal.  TSH is normal unlikely thyroid cause of palpitations.  No electrolyte abnormalities to explain palpitations.  CTV fortunately was normal no evidence of venous thrombosis or other intracranial pathology that would be concerning as a cause of his anisocoria.  On  reevaluation the headache was gone and he was feeling better.  I recommend that he start sinus rinses and corticosteroids in the nose for his bilateral ear effusions.  I recommended PCP follow-up or return to the ER if symptoms worsen.  Discharged home in stable condition              DISPOSITION AND DISCUSSIONS  I have discussed management of the patient with the following physicians and HARINI's: N/A    Discussion of management with other QHP or appropriate source(s): None     Escalation of care considered, and ultimately not performed: None    Barriers to care at this time, including but not limited to: None.     Decision tools and prescription drugs considered including, but not limited to: N/A.    FINAL DIAGNOSIS  1. Anisocoria    2. Palpitations    3. Nonintractable headache, unspecified chronicity pattern, unspecified headache type    4. Middle ear effusion, bilateral         Electronically signed by: Chinedu Barboza M.D., 11/16/2024 5:57 PM

## 2025-02-04 ENCOUNTER — OFFICE VISIT (OUTPATIENT)
Dept: URGENT CARE | Facility: PHYSICIAN GROUP | Age: 35
End: 2025-02-04
Payer: COMMERCIAL

## 2025-02-04 VITALS
RESPIRATION RATE: 14 BRPM | HEART RATE: 84 BPM | HEIGHT: 75 IN | SYSTOLIC BLOOD PRESSURE: 110 MMHG | BODY MASS INDEX: 32.95 KG/M2 | TEMPERATURE: 97.8 F | DIASTOLIC BLOOD PRESSURE: 84 MMHG | WEIGHT: 265 LBS | OXYGEN SATURATION: 97 %

## 2025-02-04 DIAGNOSIS — R68.89 FLU-LIKE SYMPTOMS: ICD-10-CM

## 2025-02-04 DIAGNOSIS — R42 DIZZINESS: ICD-10-CM

## 2025-02-04 DIAGNOSIS — H66.92 ACUTE BACTERIAL OTITIS MEDIA, LEFT: ICD-10-CM

## 2025-02-04 DIAGNOSIS — M79.10 MYALGIA: ICD-10-CM

## 2025-02-04 DIAGNOSIS — G44.209 ACUTE NON INTRACTABLE TENSION-TYPE HEADACHE: ICD-10-CM

## 2025-02-04 LAB
FLUAV RNA SPEC QL NAA+PROBE: NEGATIVE
FLUBV RNA SPEC QL NAA+PROBE: NEGATIVE
RSV RNA SPEC QL NAA+PROBE: NEGATIVE
SARS-COV-2 RNA RESP QL NAA+PROBE: NEGATIVE

## 2025-02-04 PROCEDURE — 99214 OFFICE O/P EST MOD 30 MIN: CPT | Performed by: NURSE PRACTITIONER

## 2025-02-04 PROCEDURE — 3074F SYST BP LT 130 MM HG: CPT | Performed by: NURSE PRACTITIONER

## 2025-02-04 PROCEDURE — 3079F DIAST BP 80-89 MM HG: CPT | Performed by: NURSE PRACTITIONER

## 2025-02-04 PROCEDURE — 0241U POCT CEPHEID COV-2, FLU A/B, RSV - PCR: CPT | Performed by: NURSE PRACTITIONER

## 2025-02-04 RX ORDER — KETOROLAC TROMETHAMINE 15 MG/ML
15 INJECTION, SOLUTION INTRAMUSCULAR; INTRAVENOUS ONCE
Status: COMPLETED | OUTPATIENT
Start: 2025-02-04 | End: 2025-02-04

## 2025-02-04 RX ADMIN — KETOROLAC TROMETHAMINE 15 MG: 15 INJECTION, SOLUTION INTRAMUSCULAR; INTRAVENOUS at 14:17

## 2025-02-04 ASSESSMENT — FIBROSIS 4 INDEX: FIB4 SCORE: 0.66

## 2025-02-04 NOTE — PROGRESS NOTES
Subjective:     Colin Hart is a 34 y.o. male who presents for Vertigo      HPI  Pt presents for evaluation of a new problem. Colin is a pleasant 34-year-old male who presents to urgent care today along with his wife with complaints of bodyaches and dizziness that started yesterday.  His symptoms have worsened today.  He does endorse a headache, nausea and fullness in his left ear.  He denies any chest pain, shortness of breath, numbness or tingling, weakness or confusion.  Patient has been suffering from reoccurring otitis media of left ear.  His dizziness is worse with movement.     Review of Systems   Constitutional:  Positive for chills and malaise/fatigue. Negative for fever.   HENT:  Negative for congestion and sore throat.    Eyes:  Negative for blurred vision and double vision.        Sensitivity to light   Respiratory:  Negative for cough.    Cardiovascular:  Negative for chest pain.   Musculoskeletal:  Positive for myalgias.   Neurological:  Positive for dizziness and headaches.       PMH:   Past Medical History:   Diagnosis Date    Patient denies medical problems      ALLERGIES: No Known Allergies  SURGHX:   Past Surgical History:   Procedure Laterality Date    ORIF, ELBOW Right     TONSILLECTOMY       SOCHX:   Social History     Socioeconomic History    Marital status:     Highest education level: 12th grade   Tobacco Use    Smoking status: Never    Smokeless tobacco: Current     Types: Chew   Vaping Use    Vaping status: Never Used   Substance and Sexual Activity    Alcohol use: Yes     Comment: ocassionaly    Drug use: No    Sexual activity: Yes     Partners: Female     Birth control/protection: Condom     Social Drivers of Health     Financial Resource Strain: Low Risk  (4/30/2023)    Overall Financial Resource Strain (CARDIA)     Difficulty of Paying Living Expenses: Not hard at all   Food Insecurity: No Food Insecurity (4/30/2023)    Hunger Vital Sign     Worried About Running Out of  "Food in the Last Year: Never true     Ran Out of Food in the Last Year: Never true   Transportation Needs: No Transportation Needs (4/30/2023)    PRAPARE - Transportation     Lack of Transportation (Medical): No     Lack of Transportation (Non-Medical): No   Physical Activity: Insufficiently Active (4/30/2023)    Exercise Vital Sign     Days of Exercise per Week: 3 days     Minutes of Exercise per Session: 30 min   Stress: No Stress Concern Present (4/30/2023)    [employer] - Occupational Stress Questionnaire     Feeling of Stress : Only a little   Social Connections: Moderately Integrated (4/30/2023)    Social Connection and Isolation Panel [NHANES]     Frequency of Communication with Friends and Family: More than three times a week     Frequency of Social Gatherings with Friends and Family: Twice a week     Attends Jehovah's witness Services: Never     Active Member of Clubs or Organizations: Yes     Attends Club or Organization Meetings: More than 4 times per year     Marital Status:    Housing Stability: Low Risk  (4/30/2023)    Housing Stability Vital Sign     Unable to Pay for Housing in the Last Year: No     Number of Places Lived in the Last Year: 1     Unstable Housing in the Last Year: No     FH:   Family History   Problem Relation Age of Onset    Hypertension Mother     Hypertension Father     No Known Problems Sister     Dementia Maternal Grandmother     Lung Cancer Maternal Grandfather          Objective:   /84   Pulse 84   Temp 36.6 °C (97.8 °F) (Temporal)   Resp 14   Ht 1.905 m (6' 3\")   Wt 120 kg (265 lb)   SpO2 97%   BMI 33.12 kg/m²     Physical Exam  Vitals and nursing note reviewed.   Constitutional:       General: He is not in acute distress.     Appearance: Normal appearance. He is normal weight. He is ill-appearing. He is not toxic-appearing.   HENT:      Head: Normocephalic.      Right Ear: External ear normal.      Left Ear: External ear normal. " Swelling present. No mastoid tenderness. Tympanic membrane is injected, erythematous and bulging.      Nose: Nose normal.      Mouth/Throat:      Mouth: Mucous membranes are moist.   Eyes:      General: Lids are normal.         Right eye: No discharge.         Left eye: No discharge.      Extraocular Movements: Extraocular movements intact.      Right eye: Normal extraocular motion and no nystagmus.      Left eye: Normal extraocular motion and no nystagmus.      Conjunctiva/sclera: Conjunctivae normal.      Pupils: Pupils are equal, round, and reactive to light.      Comments: Right pupil 4 mm, left pupil 2 mm.  Pupils are round and reactive to light.  Patient does have a history of anasarca   Cardiovascular:      Rate and Rhythm: Normal rate and regular rhythm.      Pulses: Normal pulses.      Heart sounds: Normal heart sounds.   Pulmonary:      Effort: Pulmonary effort is normal.      Breath sounds: Normal breath sounds.   Abdominal:      General: Abdomen is flat.   Musculoskeletal:         General: Normal range of motion.      Cervical back: Normal range of motion and neck supple. No rigidity.   Lymphadenopathy:      Cervical: No cervical adenopathy.   Skin:     General: Skin is warm and dry.   Neurological:      General: No focal deficit present.      Mental Status: He is alert and oriented to person, place, and time. Mental status is at baseline.      Cranial Nerves: No cranial nerve deficit.      Sensory: No sensory deficit.      Motor: No weakness.      Coordination: Coordination normal.      Gait: Gait normal.      Deep Tendon Reflexes: Reflexes normal.   Psychiatric:         Mood and Affect: Mood normal.         Behavior: Behavior normal.         Judgment: Judgment normal.       Results for orders placed or performed in visit on 02/04/25   POCT CoV-2, Flu A/B, RSV by PCR    Collection Time: 02/04/25  3:05 PM   Result Value Ref Range    SARS-CoV-2 by PCR Negative Negative, Invalid    Influenza virus A RNA  Negative Negative, Invalid    Influenza virus B, PCR Negative Negative, Invalid    RSV, PCR Negative Negative, Invalid       Assessment/Plan:   Assessment    1. Acute non intractable tension-type headache  ketorolac (Toradol) 15 MG/ML injection 15 mg      2. Dizziness  POCT CoV-2, Flu A/B, RSV by PCR      3. Myalgia  POCT CoV-2, Flu A/B, RSV by PCR      4. Flu-like symptoms  POCT CoV-2, Flu A/B, RSV by PCR      5. Acute bacterial otitis media, left  doxycycline (VIBRAMYCIN) 100 MG Tab    predniSONE (DELTASONE) 20 MG Tab          Patient was given 15 mg of IM Toradol for relief of headache.  This did improve his symptoms.  He was educated to refrain from additional ibuprofen for the next 24 hours.  He may use Tylenol as needed for additional relief of headache and bodyaches.  Flu, COVID, RSV negative in clinic today.  We did discuss differential diagnoses including CVA although, no strokelike symptoms other than headache and dizziness.  Patient does have a documented history of anasarca in the past.  His left ear is inflamed and infected.  I do believe his symptoms of headache, dizziness and bodyaches are secondary to this infection.  He was started on doxycycline and prednisone for relief of discomfort.  Patient to follow-up in emergency room for worsening or persistent symptoms.  He is in agreement with plan of care.  Work note provided today. Time spent evaluating this patient was at least 30 minutes and includes preparing for visit, counseling/education, exam and evaluation, obtaining history, independent interpretation and ordering labs/tests/procedures.

## 2025-02-04 NOTE — LETTER
February 4, 2025    To Whom It May Concern:         This is confirmation that Colin Hart attended his scheduled appointment with LEWIS Bonilla on 2/04/25. Please excuse her absence due to an acute illness. He may return to work on 2/8/2025.          If you have any questions please do not hesitate to call me at the phone number listed below.    Sincerely,          SAMUEL Bonilla.  [phone]

## 2025-02-05 RX ORDER — DOXYCYCLINE HYCLATE 100 MG
100 TABLET ORAL 2 TIMES DAILY
Qty: 14 TABLET | Refills: 0 | Status: SHIPPED | OUTPATIENT
Start: 2025-02-05 | End: 2025-02-12

## 2025-02-05 RX ORDER — PREDNISONE 20 MG/1
40 TABLET ORAL DAILY
Qty: 10 TABLET | Refills: 0 | Status: SHIPPED | OUTPATIENT
Start: 2025-02-05 | End: 2025-02-10

## 2025-02-05 ASSESSMENT — ENCOUNTER SYMPTOMS
COUGH: 0
MYALGIAS: 1
SORE THROAT: 0
BLURRED VISION: 0
FEVER: 0
CHILLS: 1
DOUBLE VISION: 0
DIZZINESS: 1
HEADACHES: 1

## 2025-02-19 NOTE — Clinical Note
REFERRAL APPROVAL NOTICE         Sent on February 19, 2025                   Colin Hart  [address] [place] [address]                   Dear Mr. Hart,    After a careful review of the medical information and benefit coverage, [facility] has processed your referral. See below for additional details.    If applicable, you must be actively enrolled with your insurance for coverage of the authorized service. If you have any questions regarding your coverage, please contact your insurance directly.    REFERRAL INFORMATION   Referral #:  [id number]  Referred-To Department    Referred-By Provider:  Physical Therapy    Cris Contreras M.D.   Phys Therapy [facility]      [address]  [place] [address]  [phone] [address] [facility] [address]  [place] [id number] [phone]    Referral Start Date:  02/19/2025  Referral End Date:   02/19/2026             SCHEDULING  If you do not already have an appointment, please call [phone] to make an appointment.     MORE INFORMATION  If you do not already have a [employer] account, sign up at: [email or web address]  You can access your medical information, make appointments, see lab results, billing information, and more.  If you have questions regarding this referral, please contact  the [facility] Referrals department at:             [phone]. Monday - Friday 8:00AM - 5:00PM.     Sincerely,    [facility]

## 2025-04-24 ENCOUNTER — APPOINTMENT (OUTPATIENT)
Dept: RADIOLOGY | Facility: MEDICAL CENTER | Age: 35
End: 2025-04-24
Attending: OTOLARYNGOLOGY
Payer: COMMERCIAL

## 2025-07-05 ENCOUNTER — HOSPITAL ENCOUNTER (EMERGENCY)
Facility: MEDICAL CENTER | Age: 35
End: 2025-07-05
Attending: STUDENT IN AN ORGANIZED HEALTH CARE EDUCATION/TRAINING PROGRAM
Payer: COMMERCIAL

## 2025-07-05 VITALS
WEIGHT: 257.72 LBS | SYSTOLIC BLOOD PRESSURE: 131 MMHG | RESPIRATION RATE: 19 BRPM | OXYGEN SATURATION: 96 % | HEART RATE: 83 BPM | TEMPERATURE: 99.5 F | HEIGHT: 75 IN | DIASTOLIC BLOOD PRESSURE: 77 MMHG | BODY MASS INDEX: 32.04 KG/M2

## 2025-07-05 DIAGNOSIS — S39.012A STRAIN OF LUMBAR REGION, INITIAL ENCOUNTER: Primary | ICD-10-CM

## 2025-07-05 PROCEDURE — 99283 EMERGENCY DEPT VISIT LOW MDM: CPT

## 2025-07-05 PROCEDURE — 700111 HCHG RX REV CODE 636 W/ 250 OVERRIDE (IP): Mod: JZ | Performed by: STUDENT IN AN ORGANIZED HEALTH CARE EDUCATION/TRAINING PROGRAM

## 2025-07-05 PROCEDURE — 700101 HCHG RX REV CODE 250: Performed by: STUDENT IN AN ORGANIZED HEALTH CARE EDUCATION/TRAINING PROGRAM

## 2025-07-05 PROCEDURE — A9270 NON-COVERED ITEM OR SERVICE: HCPCS | Performed by: STUDENT IN AN ORGANIZED HEALTH CARE EDUCATION/TRAINING PROGRAM

## 2025-07-05 PROCEDURE — 700102 HCHG RX REV CODE 250 W/ 637 OVERRIDE(OP): Performed by: STUDENT IN AN ORGANIZED HEALTH CARE EDUCATION/TRAINING PROGRAM

## 2025-07-05 PROCEDURE — 96372 THER/PROPH/DIAG INJ SC/IM: CPT

## 2025-07-05 RX ORDER — LIDOCAINE 50 MG/G
1 PATCH TOPICAL EVERY 24 HOURS
Qty: 10 PATCH | Refills: 0 | Status: SHIPPED | OUTPATIENT
Start: 2025-07-05

## 2025-07-05 RX ORDER — ACETAMINOPHEN 500 MG
1000 TABLET ORAL ONCE
Status: COMPLETED | OUTPATIENT
Start: 2025-07-05 | End: 2025-07-05

## 2025-07-05 RX ORDER — METHOCARBAMOL 750 MG/1
750 TABLET, FILM COATED ORAL NIGHTLY PRN
Qty: 5 TABLET | Refills: 0 | Status: SHIPPED | OUTPATIENT
Start: 2025-07-05 | End: 2025-07-10

## 2025-07-05 RX ORDER — PREDNISONE 20 MG/1
40 TABLET ORAL DAILY
Qty: 10 TABLET | Refills: 0 | Status: SHIPPED | OUTPATIENT
Start: 2025-07-05 | End: 2025-07-10

## 2025-07-05 RX ORDER — NAPROXEN 500 MG/1
500 TABLET ORAL 2 TIMES DAILY WITH MEALS
Qty: 14 TABLET | Refills: 0 | Status: SHIPPED | OUTPATIENT
Start: 2025-07-05 | End: 2025-07-12

## 2025-07-05 RX ORDER — LIDOCAINE 4 G/G
2 PATCH TOPICAL ONCE
Status: DISCONTINUED | OUTPATIENT
Start: 2025-07-05 | End: 2025-07-05 | Stop reason: HOSPADM

## 2025-07-05 RX ORDER — ACETAMINOPHEN 500 MG
1000 TABLET ORAL EVERY 8 HOURS PRN
Qty: 21 TABLET | Refills: 0 | Status: SHIPPED | OUTPATIENT
Start: 2025-07-05 | End: 2025-07-12

## 2025-07-05 RX ORDER — DIAZEPAM 5 MG/1
5 TABLET ORAL ONCE
Status: COMPLETED | OUTPATIENT
Start: 2025-07-05 | End: 2025-07-05

## 2025-07-05 RX ORDER — DEXAMETHASONE 4 MG/1
16 TABLET ORAL ONCE
Status: COMPLETED | OUTPATIENT
Start: 2025-07-05 | End: 2025-07-05

## 2025-07-05 RX ORDER — KETOROLAC TROMETHAMINE 15 MG/ML
15 INJECTION, SOLUTION INTRAMUSCULAR; INTRAVENOUS ONCE
Status: COMPLETED | OUTPATIENT
Start: 2025-07-05 | End: 2025-07-05

## 2025-07-05 RX ADMIN — ACETAMINOPHEN 1000 MG: 500 TABLET ORAL at 17:59

## 2025-07-05 RX ADMIN — LIDOCAINE 2 PATCH: 4 PATCH TOPICAL at 17:59

## 2025-07-05 RX ADMIN — DEXAMETHASONE 16 MG: 4 TABLET ORAL at 17:59

## 2025-07-05 RX ADMIN — KETOROLAC TROMETHAMINE 15 MG: 15 INJECTION, SOLUTION INTRAMUSCULAR; INTRAVENOUS at 18:00

## 2025-07-05 RX ADMIN — DIAZEPAM 5 MG: 5 TABLET ORAL at 17:59

## 2025-07-05 ASSESSMENT — PAIN DESCRIPTION - PAIN TYPE
TYPE: ACUTE PAIN
TYPE: ACUTE PAIN

## 2025-07-05 ASSESSMENT — FIBROSIS 4 INDEX: FIB4 SCORE: 0.66

## 2025-07-05 NOTE — Clinical Note
Colin Hart was seen and treated in our emergency department on 7/5/2025.  He may return to work on 07/16/2025.       If you have any questions or concerns, please don't hesitate to call.      Vargas Murdock D.O.

## 2025-07-05 NOTE — ED TRIAGE NOTES
"Chief Complaint   Patient presents with    Low Back Pain     \"Really bad\". Unable to bed over or move comfortably.   Ongoing all week, worse since Thursday.   Unrelieved by heat/cold/TENS/motrin/tylenol/stretching/icy hot/deep blue/ETC.   Endorses radiating pain down his RLE.   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      Blood Pressure: 120/88, Pulse: 95, Respiration: 18, Temperature: 37.5 °C (99.5 °F), Height: 190.5 cm (6' 3\"), Weight: 117 kg (257 lb 11.5 oz), BMI (Calculated): 32.21, BSA (Calculated): 2.5, Pulse Oximetry: 99 %, O2 Delivery Device: None - Room Air  "

## 2025-07-06 NOTE — ED PROVIDER NOTES
"ED Provider Note    CHIEF COMPLAINT  Chief Complaint   Patient presents with    Low Back Pain     \"Really bad\". Unable to bed over or move comfortably.   Ongoing all week, worse since Thursday.   Unrelieved by heat/cold/TENS/motrin/tylenol/stretching/icy hot/deep blue/ETC.   Endorses radiating pain down his RLE.   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        EXTERNAL RECORDS REVIEWED  Outpatient Notes urgent care visit from 2/4/2025 patient seen for dizziness    HPI/ROS  LIMITATION TO HISTORY     OUTSIDE HISTORIAN(S):  Significant other at bedside    Colin Hart is a 34 y.o. male who presents with low back pain.  Patient says he works as a .  Patient says over the past week he has had worsening right-sided lower back pain.  Patient does not remember distinct episode of heavy lifting or trauma.  Patient reports previous history of lower back pain that is improved with over-the-counter medicines and chiropractic interventions.  Patient denies previous spinal surgeries or injections.  Patient denies fever, chills, abdominal pain, nausea, vomiting, diarrhea, urinary or bowel changes.  Patient describes pain radiating down to his right mid thigh but no extremity weakness or numbness.  Patient denies drug or alcohol use.  Patient's been using ibuprofen and acetaminophen intermittently without relief.    PAST MEDICAL HISTORY   has a past medical history of Patient denies medical problems.    SURGICAL HISTORY   has a past surgical history that includes tonsillectomy and orif, elbow (Right).    FAMILY HISTORY  Family History   Problem Relation Age of Onset    Hypertension Mother     Hypertension Father     No Known Problems Sister     Dementia Maternal Grandmother     Lung Cancer Maternal Grandfather        SOCIAL HISTORY  Social History     Tobacco Use    Smoking status: Never    Smokeless tobacco: Current     Types: Chew    Tobacco comments:     binta   Vaping " "Use    Vaping status: Never Used   Substance and Sexual Activity    Alcohol use: Yes     Comment: rarely    Drug use: No    Sexual activity: Yes     Partners: Female     Birth control/protection: Condom       CURRENT MEDICATIONS  Home Medications       Reviewed by Og Neil R.N. (Registered Nurse) on 07/05/25 at 1649  Med List Status: Not Addressed     Medication Last Dose Status   meclizine (ANTIVERT) 25 MG Tab  Active                    ALLERGIES  Allergies[1]    PHYSICAL EXAM  VITAL SIGNS: /77   Pulse 83   Temp 37.5 °C (99.5 °F) (Temporal)   Resp 19   Ht 1.905 m (6' 3\")   Wt 117 kg (257 lb 11.5 oz)   SpO2 96%   BMI 32.21 kg/m²    Constitutional: Alert in no apparent distress.  HENT: No signs of trauma, Bilateral external ears normal, Nose normal.   Eyes: Pupils are equal and reactive, Conjunctiva normal, Non-icteric.   Neck: Normal range of motion, No tenderness, Supple, No stridor.   Cardiovascular: Regular rate and rhythm, no murmurs.   Thorax & Lungs: Normal breath sounds, No respiratory distress, No wheezing, No chest tenderness.   Abdomen: Bowel sounds normal, Soft, No tenderness, No masses, No pulsatile masses.   Skin: Warm, Dry, No erythema, No rash.   Back: No bony tenderness, No CVA tenderness.   Extremities: Intact distal pulses fluting bilateral DP/PT pulses, No edema, No tenderness, No cyanosis  Musculoskeletal: Good range of motion in all major joints. No tenderness to palpation or major deformities noted.   Neurologic: Alert , Normal motor function, Normal sensory function, No focal deficits noted.  5/5 strength bilateral hips, knees plantarflexion, sensation is intact to light touch throughout bilateral lower extremities, no saddle anesthesia            COURSE & MEDICAL DECISION MAKING    ASSESSMENT, COURSE AND PLAN  Care Narrative: Patient presenting with 1 week of right-sided radiating lower back pain.  Patient has no exam or history to suggest underlying spinal cord " compression including cauda equina, central nervous system infection including osteomyelitis, discitis or epidural abscess.  No exam to suggest referred pain from intra-abdominal or vascular pathology.  No signs or history of trauma.  Will initiate analgesics and close reassess patient's symptoms.  Discussed with patient expected course of lumbar back strain, need for primary care follow-up, referral to physical therapy, return precautions which she is comfortable with.  Based on patient's exam and history of tach believe spinal imaging including MRI is indicated today.    Reassessment patient report significant movement pain has been able to ambulate.  Discussed with patient plan for course of steroids, topical anesthetic, NSAIDs, acetaminophen and muscle actions.  Discussed with patient plan for close follow-up with primary care provider and possible [place] of physical therapy.  Discussed with patient strict return precautions which he and his partner are comfortable with.              ADDITIONAL PROBLEMS MANAGED      DISPOSITION AND DISCUSSIONS  Escalation of care considered, and ultimately not performed:diagnostic imaging spinal imaging         Decision tools and prescription drugs considered including, but not limited to: Topical anesthetics, steroid.    FINAL DIAGNOSIS  1. Strain of lumbar region, initial encounter         Electronically signed by: Vargas Murdock D.O., 7/5/2025 5:24 PM           [1] No Known Allergies

## 2025-07-06 NOTE — DISCHARGE INSTRUCTIONS
We have given you prescription for steroids and lidocaine patch which you should use as directed.  We have given prescription for naproxen, muscle relaxant and acetaminophen which you can use as needed.  Do not take more than 3000 mg of acetaminophen daily.  You should make appointment with your primary care prior.  If you have fever, severe pain, leg weakness, urinary or bowel incontinence please return to the emergency room.

## 2025-07-06 NOTE — ED NOTES
Patient given discharge instructions, verbalized understanding. Rx given for lidocaine patch, tylenol, robaxin, naproxen, prednisone. Patient instructed to follow up with PCP. Education provided to come to ER if symptoms worsen. Discharged in stable condition with wife, able to walk out with independent gait.